# Patient Record
Sex: FEMALE | Race: WHITE | Employment: FULL TIME | URBAN - METROPOLITAN AREA
[De-identification: names, ages, dates, MRNs, and addresses within clinical notes are randomized per-mention and may not be internally consistent; named-entity substitution may affect disease eponyms.]

---

## 2017-05-23 ENCOUNTER — RESULT FOLLOW UP (OUTPATIENT)
Dept: FAMILY MEDICINE | Facility: CLINIC | Age: 32
End: 2017-05-23

## 2017-05-23 ENCOUNTER — OFFICE VISIT (OUTPATIENT)
Dept: FAMILY MEDICINE | Facility: CLINIC | Age: 32
End: 2017-05-23
Payer: COMMERCIAL

## 2017-05-23 VITALS
HEIGHT: 64 IN | DIASTOLIC BLOOD PRESSURE: 76 MMHG | HEART RATE: 70 BPM | TEMPERATURE: 98 F | OXYGEN SATURATION: 100 % | BODY MASS INDEX: 18.95 KG/M2 | SYSTOLIC BLOOD PRESSURE: 112 MMHG | WEIGHT: 111 LBS

## 2017-05-23 DIAGNOSIS — Z12.4 SCREENING FOR MALIGNANT NEOPLASM OF CERVIX: ICD-10-CM

## 2017-05-23 DIAGNOSIS — N87.1 CIN II (CERVICAL INTRAEPITHELIAL NEOPLASIA II): ICD-10-CM

## 2017-05-23 DIAGNOSIS — Z30.8 ENCOUNTER FOR OTHER CONTRACEPTIVE MANAGEMENT: ICD-10-CM

## 2017-05-23 DIAGNOSIS — Z01.419 ENCOUNTER FOR GYNECOLOGICAL EXAMINATION WITHOUT ABNORMAL FINDING: Primary | ICD-10-CM

## 2017-05-23 DIAGNOSIS — Z02.9 ADMINISTRATIVE ENCOUNTER: ICD-10-CM

## 2017-05-23 LAB
CHOLEST SERPL-MCNC: 163 MG/DL
GLUCOSE SERPL-MCNC: 99 MG/DL (ref 70–99)
HDLC SERPL-MCNC: 57 MG/DL
HGB BLD-MCNC: 13.8 G/DL (ref 11.7–15.7)
LDLC SERPL CALC-MCNC: 81 MG/DL
NONHDLC SERPL-MCNC: 106 MG/DL
TRIGL SERPL-MCNC: 123 MG/DL

## 2017-05-23 PROCEDURE — G0145 SCR C/V CYTO,THINLAYER,RESCR: HCPCS | Performed by: NURSE PRACTITIONER

## 2017-05-23 PROCEDURE — 87491 CHLMYD TRACH DNA AMP PROBE: CPT | Performed by: NURSE PRACTITIONER

## 2017-05-23 PROCEDURE — 87591 N.GONORRHOEAE DNA AMP PROB: CPT | Performed by: NURSE PRACTITIONER

## 2017-05-23 PROCEDURE — 80061 LIPID PANEL: CPT | Performed by: NURSE PRACTITIONER

## 2017-05-23 PROCEDURE — 87624 HPV HI-RISK TYP POOLED RSLT: CPT | Performed by: NURSE PRACTITIONER

## 2017-05-23 PROCEDURE — 87389 HIV-1 AG W/HIV-1&-2 AB AG IA: CPT | Performed by: NURSE PRACTITIONER

## 2017-05-23 PROCEDURE — 85018 HEMOGLOBIN: CPT | Performed by: NURSE PRACTITIONER

## 2017-05-23 PROCEDURE — 86803 HEPATITIS C AB TEST: CPT | Performed by: NURSE PRACTITIONER

## 2017-05-23 PROCEDURE — 82947 ASSAY GLUCOSE BLOOD QUANT: CPT | Performed by: NURSE PRACTITIONER

## 2017-05-23 PROCEDURE — 36415 COLL VENOUS BLD VENIPUNCTURE: CPT | Performed by: NURSE PRACTITIONER

## 2017-05-23 PROCEDURE — 99395 PREV VISIT EST AGE 18-39: CPT | Performed by: NURSE PRACTITIONER

## 2017-05-23 PROCEDURE — 86780 TREPONEMA PALLIDUM: CPT | Performed by: NURSE PRACTITIONER

## 2017-05-23 RX ORDER — ETONOGESTREL AND ETHINYL ESTRADIOL VAGINAL RING .015; .12 MG/D; MG/D
1 RING VAGINAL
Qty: 4 EACH | Refills: 4 | Status: SHIPPED | OUTPATIENT
Start: 2017-05-23 | End: 2017-08-11

## 2017-05-23 RX ORDER — CYCLOSPORINE 0.5 MG/ML
EMULSION OPHTHALMIC
COMMUNITY
Start: 2017-01-23 | End: 2018-06-11

## 2017-05-23 NOTE — PROGRESS NOTES
SUBJECTIVE:     CC: Gracie Sanches is an 32 year old woman who presents for preventive health visit.     Physical   Annual:     Getting at least 3 servings of Calcium per day::  Yes    Bi-annual eye exam::  Yes    Dental care twice a year::  NO    Sleep apnea or symptoms of sleep apnea::  Daytime drowsiness    Diet::  Regular (no restrictions)    Frequency of exercise::  2-3 days/week    Duration of exercise::  15-30 minutes    Taking medications regularly::  Yes    Medication side effects::  None    Additional concerns today::  YES    1. 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  She is requesting forms be completed.        Today's PHQ-2 Score:   PHQ-2 ( 1999 [employer]) 5/20/2017   Q1: Little interest or pleasure in doing things -   Q2: Feeling down, depressed or hopeless -   PHQ-2 Score -   Little interest or pleasure in doing things Not at all   Feeling down, depressed or hopeless Not at all   PHQ-2 Score 0       Abuse: Current or Past(Physical, Sexual or Emotional)- No  Do you feel safe in your environment - Yes    Social History   Substance Use Topics     Smoking status: Never Smoker     Smokeless tobacco: Not on file     Alcohol use Yes      Comment: very infrequent     The patient does not drink >3 drinks per day nor >7 drinks per week.    Recent Labs   Lab Test  06/10/14   1114   CHOL  151   HDL  56   LDL  72   TRIG  118   CHOLHDLRATIO  2.7       Reviewed orders with patient.  Reviewed health maintenance and updated orders accordingly - Yes    Mammo Decision Support:  Mammo discussed, not appropriate for or declined by this patient.    Pertinent mammograms are reviewed under the imaging tab.  History of abnormal Pap smear:   Last 3 Pap Results:   PAP (no units)   Date Value   06/10/2014 NIL       Reviewed and updated as needed this visit by clinical staff  Tobacco  Allergies  Meds  Med Hx  Surg Hx  Fam Hx  Soc Hx        Reviewed and updated as needed this visit  by Provider            ROS:  C: NEGATIVE for fever, chills, change in weight  I: NEGATIVE for worrisome rashes, moles or lesions  E: NEGATIVE for vision changes or irritation  ENT: NEGATIVE for ear, mouth and throat problems  R: NEGATIVE for significant cough or SOB  B: NEGATIVE for masses, tenderness or discharge  CV: NEGATIVE for chest pain, palpitations or peripheral edema  GI: NEGATIVE for nausea, abdominal pain, heartburn, or change in bowel habits  : NEGATIVE for unusual urinary or vaginal symptoms. Periods are regular/light.  M: NEGATIVE for significant arthralgias or myalgia  N: NEGATIVE for weakness, dizziness or paresthesias  E: NEGATIVE for temperature intolerance, skin/hair changes  P: NEGATIVE for changes in mood or affect    Problem list, Medication list, Allergies, and Medical/Social/Surgical histories reviewed in [facility] and updated as appropriate.  Labs reviewed in EPIC  BP Readings from Last 3 Encounters:   05/23/17 112/76   12/09/16 120/70   11/02/16 92/60    Wt Readings from Last 3 Encounters:   05/23/17 111 lb (50.3 kg)   12/09/16 108 lb (49 kg)   11/02/16 108 lb (49 kg)              Patient Active Problem List   Diagnosis     Chronic urinary urgency     Hordeolum externum, unspecified laterality     Lump of right breast 10 o'clock position     Past Surgical History:   Procedure Laterality Date     BIOPSY  2011    mole removed from foot- benign     COLONOSCOPY  2010       Social History   Substance Use Topics     Smoking status: Never Smoker     Smokeless tobacco: Not on file     Alcohol use Yes      Comment: very infrequent     Family History   Problem Relation Age of Onset     CANCER Father      ?squamous     Other - See Comments Father      diverticulitis      Hyperlipidemia Father      Other Cancer Father      Skin cancer many years ago, was removed     Skin Cancer Father      CEREBROVASCULAR DISEASE Paternal Grandfather      Hypertension Paternal Grandfather      Hypertension Paternal  "Grandmother      Asthma Sister      execma     Depression Sister      Asthma Sister      Melanoma No family hx of          Current Outpatient Prescriptions   Medication Sig Dispense Refill     etonogestrel-ethinyl estradiol (NUVARING) 0.12-0.015 MG/24HR vaginal ring Place 1 each vaginally every 28 days 4 each 4     Omega-3 Fatty Acids (OMEGA-3 FISH OIL PO)        UNABLE TO FIND MEDICATION NAME: Calcium       cetirizine (ZYRTEC) 10 MG tablet Take 10 mg by mouth daily       Cholecalciferol (VITAMIN D3 PO) Take 1,000 Units by mouth daily       RESTASIS 0.05 % ophthalmic emulsion        ketoconazole (NIZORAL) 2 % shampoo Use to cleanse body daily. (Patient not taking: Reported on 5/23/2017) 120 mL 11     [DISCONTINUED] etonogestrel-ethinyl estradiol (NUVARING) 0.12-0.015 MG/24HR vaginal ring Place 1 each vaginally every 28 days 4 each 4     Allergies   Allergen Reactions     Seasonal Allergies Fatigue, Hives, Itching and Other (See Comments)     Augmentin Rash     Recent Labs   Lab Test  06/26/15   1029  06/10/14   1114   LDL   --   72   HDL   --   56   TRIG   --   118   TSH  1.18   --       OBJECTIVE:     /76  Pulse 70  Temp 98  F (36.7  C) (Oral)  Ht 5' 3.75\" (1.619 m)  Wt 111 lb (50.3 kg)  LMP 05/14/2017  SpO2 100%  Breastfeeding? No  BMI 19.2 kg/m2  EXAM:  GENERAL: healthy, alert and no distress  EYES: Eyes grossly normal to inspection, PERRL and conjunctivae and sclerae normal  HENT: ear canals and TM's normal, nose and mouth without ulcers or lesions  NECK: no adenopathy, no asymmetry, masses, or scars and thyroid normal to palpation  RESP: lungs clear to auscultation - no rales, rhonchi or wheezes  BREAST: normal without masses, tenderness or nipple discharge and no palpable axillary masses or adenopathy  CV: regular rate and rhythm, normal S1 S2, no S3 or S4, no murmur, click or rub, no peripheral edema and peripheral pulses strong  ABDOMEN: soft, nontender, no hepatosplenomegaly, no masses and " "bowel sounds normal   (female): normal female external genitalia, normal urethral meatus, vaginal mucosa pink, moist, well rugated, and normal cervix/adnexa/uterus without masses or discharge  MS: no gross musculoskeletal defects noted, no edema  SKIN: no suspicious lesions or rashes  NEURO: Normal strength and tone, mentation intact and speech normal  PSYCH: mentation appears normal, affect normal/bright    ASSESSMENT/PLAN:     (Z01.419) Encounter for gynecological examination without abnormal finding  (primary encounter diagnosis)  Comment:   Plan: Pap imaged thin layer screen with HPV -         recommended age 30 - 65 years (select HPV order        below), HPV High Risk Types DNA Cervical, Lipid        panel reflex to direct LDL, Hemoglobin,         Glucose, Chlamydia trachomatis PCR, Neisseria         gonorrhoeae PCR, Anti Treponema, Hepatitis C         antibody, HIV Antigen Antibody Combo            (Z02.9) Administrative encounter  Comment:   Plan: forms for school were completed, signed, and sent with the patient.     (Z30.8) Encounter for other contraceptive management  Comment:   Plan: etonogestrel-ethinyl estradiol (NUVARING)         0.12-0.015 MG/24HR vaginal ring        Refills given    (Z12.4) Screening for malignant neoplasm of cervix  Comment:   Plan: done today        COUNSELING:  Reviewed preventive health counseling, as reflected in patient instructions         reports that she has never smoked. She does not have any smokeless tobacco history on file.    Estimated body mass index is 19.2 kg/(m^2) as calculated from the following:    Height as of this encounter: 5' 3.75\" (1.619 m).    Weight as of this encounter: 111 lb (50.3 kg).   Weight management plan noted, stable and monitoring    Counseling Resources:  ATP IV Guidelines  Pooled Cohorts Equation Calculator  Breast Cancer Risk Calculator  FRAX Risk Assessment  ICSI Preventive Guidelines  Dietary Guidelines for Americans, 2010  USDA's " MyPlate  ASA Prophylaxis  Lung CA Screening    DEBRA Camargo [facility]  Answers for HPI/ROS submitted by the patient on 5/20/2017   Q1: Little interest or pleasure in doing things: 0=Not at all  Q2: Feeling down, depressed or hopeless: 0=Not at all  PHQ-2 Score: 0

## 2017-05-23 NOTE — MR AVS SNAPSHOT
After Visit Summary   5/23/2017    Gracie Sanches    MRN: [id number]           Patient Information     Date Of Birth          1985        Visit Information        Provider Department      5/23/2017 11:40 AM Ava Olsen APRN [facility]        Today's Diagnoses     Encounter for gynecological examination without abnormal finding    -  1    Administrative encounter        Encounter for other contraceptive management        Screening for malignant neoplasm of cervix          Care Instructions      Preventive Health Recommendations  Female Ages 26 - 39  Yearly exam:   See your health care provider every year in order to    Review health changes.     Discuss preventive care.      Review your medicines if you your doctor has prescribed any.    Until age 30: Get a Pap test every three years (more often if you have had an abnormal result).    After age 30: Talk to your doctor about whether you should have a Pap test every 3 years or have a Pap test with HPV screening every 5 years.   You do not need a Pap test if your uterus was removed (hysterectomy) and you have not had cancer.  You should be tested each year for STDs (sexually transmitted diseases), if you're at risk.   Talk to your provider about how often to have your cholesterol checked.  If you are at risk for diabetes, you should have a diabetes test (fasting glucose).  Shots: Get a flu shot each year. Get a tetanus shot every 10 years.   Nutrition:     Eat at least 5 servings of fruits and vegetables each day.    Eat whole-grain bread, whole-wheat pasta and brown rice instead of white grains and rice.    Talk to your provider about Calcium and Vitamin D.     Lifestyle    Exercise at least 150 minutes a week (30 minutes a day, 5 days of the week). This will help you control your weight and prevent disease.    Limit alcohol to one drink per day.    No smoking.     Wear sunscreen to prevent skin  "cancer.    See your dentist every six months for an exam and cleaning.          Follow-ups after your visit        Your next 10 appointments already scheduled     Jun 01, 2017  1:40 PM CDT   (Arrive by 1:25 PM)   [facility] with Yuliya Holland OD   [facility] ([facility] [place])    [address]  [address] [facility] [place] [address] [phone]              Who to contact     If you have questions or need follow up information about today's clinic visit or your schedule please contact [facility] directly at [phone].  Normal or non-critical lab and imaging results will be communicated to you by SpokenLayerhart, letter or phone within 4 business days after the clinic has received the results. If you do not hear from us within 7 days, please contact the clinic through [employer] or phone. If you have a critical or abnormal lab result, we will notify you by phone as soon as possible.  Submit refill requests through [employer] or call your pharmacy and they will forward the refill request to us. Please allow 3 business days for your refill to be completed.          Additional Information About Your Visit        [facility] Information     [employer] gives you secure access to your electronic health record. If you see a primary care provider, you can also send messages to your care team and make appointments. If you have questions, please call your primary care clinic.  If you do not have a primary care provider, please call [phone] and they will assist you.        Care EveryWhere ID     This is your Care EveryWhere ID. This could be used by other organizations to access your [facility] medical records  [id number]        Your Vitals Were     Pulse Temperature Height Last Period Pulse Oximetry Breastfeeding?    70 98  F (36.7  C) (Oral) 5' 3.75\" (1.619 m) 05/14/2017 100% No    BMI (Body Mass Index)                   19.2 kg/m2            Blood Pressure " from Last 3 Encounters:   05/23/17 112/76   12/09/16 120/70   11/02/16 92/60    Weight from Last 3 Encounters:   05/23/17 111 lb (50.3 kg)   12/09/16 108 lb (49 kg)   11/02/16 108 lb (49 kg)              We Performed the Following     Anti Treponema     Chlamydia trachomatis PCR     Glucose     Hemoglobin     Hepatitis C antibody     HIV Antigen Antibody Combo     HPV High Risk Types DNA Cervical     Lipid panel reflex to direct LDL     Neisseria gonorrhoeae PCR     Pap imaged thin layer screen with HPV - recommended age 30 - 65 years (select HPV order below)          Today's Medication Changes          These changes are accurate as of: 5/23/17  1:12 PM.  If you have any questions, ask your nurse or doctor.               Stop taking these medicines if you haven't already. Please contact your care team if you have questions.     ALLEGRA-D 24 HOUR PO   Stopped by:  Ava Olsen APRN CNP           FLAX SEED OIL PO   Stopped by:  Ava Olsen APRN CNP           UNABLE TO FIND   Stopped by:  Ava Olsen APRN CNP                Where to get your medicines      Some of these will need a paper prescription and others can be bought over the counter.  Ask your nurse if you have questions.     Bring a paper prescription for each of these medications     etonogestrel-ethinyl estradiol 0.12-0.015 MG/24HR vaginal ring                Primary Care Provider Office Phone # Fax #    DEBRA Field [phone]       [facility] [address] [place] [address]        Thank you!     Thank you for choosing [facility]  for your care. Our goal is always to provide you with excellent care. Hearing back from our patients is one way we can continue to improve our services. Please take a few minutes to complete the written survey that you may receive in the mail after your visit with us. Thank you!             Your Updated Medication  List - Protect others around you: Learn how to safely use, store and throw away your medicines at www.disposemymeds.org.          This list is accurate as of: 5/23/17  1:12 PM.  Always use your most recent med list.                   Brand Name Dispense Instructions for use    cetirizine 10 MG tablet    zyrTEC     Take 10 mg by mouth daily       etonogestrel-ethinyl estradiol 0.12-0.015 MG/24HR vaginal ring    NUVARING    4 each    Place 1 each vaginally every 28 days       ketoconazole 2 % shampoo    NIZORAL    120 mL    Use to cleanse body daily.       OMEGA-3 FISH OIL PO          RESTASIS 0.05 % ophthalmic emulsion   Generic drug:  cycloSPORINE          UNABLE TO FIND      MEDICATION NAME: Calcium       VITAMIN D3 PO      Take 1,000 Units by mouth daily

## 2017-05-23 NOTE — NURSING NOTE
"Chief Complaint   Patient presents with     Physical     Forms       Initial /76  Pulse 70  Temp 98  F (36.7  C) (Oral)  Ht 5' 3.75\" (1.619 m)  Wt 111 lb (50.3 kg)  LMP 05/14/2017  SpO2 100%  Breastfeeding? No  BMI 19.2 kg/m2 Estimated body mass index is 19.2 kg/(m^2) as calculated from the following:    Height as of this encounter: 5' 3.75\" (1.619 m).    Weight as of this encounter: 111 lb (50.3 kg).  Medication Reconciliation: complete       Al Corona MA       "

## 2017-05-24 LAB
C TRACH DNA SPEC QL NAA+PROBE: NORMAL
HCV AB SERPL QL IA: NORMAL
HIV 1+2 AB+HIV1 P24 AG SERPL QL IA: NORMAL
N GONORRHOEA DNA SPEC QL NAA+PROBE: NORMAL
SPECIMEN SOURCE: NORMAL
SPECIMEN SOURCE: NORMAL
T PALLIDUM IGG+IGM SER QL: NEGATIVE

## 2017-05-25 LAB
COPATH REPORT: NORMAL
PAP: NORMAL

## 2017-05-25 NOTE — PROGRESS NOTES
Aleksander Bowman,    This note is to let you know that your labs and your STD panel all came back normal. The pap smear results will be sent to you separately.     Let me know if you have any questions.    Ava

## 2017-05-26 LAB
FINAL DIAGNOSIS: ABNORMAL
HPV HR 12 DNA CVX QL NAA+PROBE: NEGATIVE
HPV16 DNA SPEC QL NAA+PROBE: POSITIVE
HPV18 DNA SPEC QL NAA+PROBE: NEGATIVE
SPECIMEN DESCRIPTION: ABNORMAL

## 2017-05-30 PROBLEM — R87.810 CERVICAL HIGH RISK HPV (HUMAN PAPILLOMAVIRUS) TEST POSITIVE: Status: ACTIVE | Noted: 2017-05-23

## 2017-05-30 NOTE — PROGRESS NOTES
05/23/17: NIL pap, + HR HPV type 16 result. Plan [place] due by 08/23/17.  05/30/17: I called the pt and left a message for the pt to call me back. The pt returned the call and was advised of the results and recommendations.  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 Plan cotest in 1 year. Pt was advised.  08/01/18:  NIL pap, + HR HPV type 16 result. Plan [place] due bef 11/01/18.  08/08/18: Msg left to call back. Pt left a voice mail requesting that I leave her a detailed voice mail message with the results and recommendations.   08/09/18: Detailed voice mail left with the results and recommendations on the pt's cell phone number.  10/16/18: [place] Bx and ECC Neg for dysplasia. Plan cotest in 1 year. Provider sent a [employer] message to the pt with the [place] results and recommendations. Pt viewed this Insync Systemshart message.   9/10/19 NIL pap, + HR HPV 16. Plan [facility]p. ([facility])  09/17/19:Msg left to call back. Pt called and left a voice mail message requesting that I leave her a detailed voice mail message with her results and recommendations. I did do this per her request. I also sent her [employer] message with this information. Pt viewed mychart message.  11/19/19 [place]. BX normal. Plan: cotest 1 year. Viewed in my chart. ([facility])

## 2017-06-01 ENCOUNTER — OFFICE VISIT (OUTPATIENT)
Dept: OPHTHALMOLOGY | Facility: CLINIC | Age: 32
End: 2017-06-01

## 2017-06-01 DIAGNOSIS — H04.123 DRY EYES, BILATERAL: Primary | ICD-10-CM

## 2017-06-01 DIAGNOSIS — H01.00B BLEPHARITIS OF UPPER AND LOWER EYELIDS OF BOTH EYES, UNSPECIFIED TYPE: ICD-10-CM

## 2017-06-01 DIAGNOSIS — H01.00A BLEPHARITIS OF UPPER AND LOWER EYELIDS OF BOTH EYES, UNSPECIFIED TYPE: ICD-10-CM

## 2017-06-01 DIAGNOSIS — H16.223 KERATITIS SICCA, BILATERAL: ICD-10-CM

## 2017-06-01 RX ORDER — CYCLOSPORINE 0.5 MG/ML
1 EMULSION OPHTHALMIC 2 TIMES DAILY
Qty: 180 EACH | Refills: 3 | Status: SHIPPED | OUTPATIENT
Start: 2017-06-01 | End: 2018-06-11

## 2017-06-01 ASSESSMENT — REFRACTION_MANIFEST
OS_SPHERE: PLANO
OD_SPHERE: PLANO
OS_CYLINDER: SPHERE
OD_CYLINDER: SPHERE

## 2017-06-01 ASSESSMENT — CONF VISUAL FIELD
METHOD: COUNTING FINGERS
OS_NORMAL: 1
OD_NORMAL: 1

## 2017-06-01 ASSESSMENT — TONOMETRY
OS_IOP_MMHG: 25
IOP_METHOD: ICARE
OD_IOP_MMHG: 22

## 2017-06-01 ASSESSMENT — VISUAL ACUITY
METHOD: SNELLEN - LINEAR
OS_SC: 20/20
OD_SC: J1+
OD_SC: 20/20
OS_SC: J1+

## 2017-06-01 ASSESSMENT — EXTERNAL EXAM - LEFT EYE: OS_EXAM: NORMAL

## 2017-06-01 ASSESSMENT — EXTERNAL EXAM - RIGHT EYE: OD_EXAM: NORMAL

## 2017-06-01 ASSESSMENT — CUP TO DISC RATIO
OS_RATIO: 0.20
OD_RATIO: 0.20

## 2017-06-01 NOTE — NURSING NOTE
Chief Complaints and History of Present Illnesses   Patient presents with     Annual Eye Exam     Vision is good without glasses, she is on Restasis     HPI    Affected eye(s):  Both   Symptoms:     No blurred vision   No decreased vision      Frequency:  Constant       Do you have eye pain now?:  No      Comments:  Patient states that vision is stable since her last eye exam. She sees well without glasses. She is on Restasis this year BID OU. The Restasis will  in July and may need it renewed. She was told she was not blinking completely and had some dryness. Denies eye pain. She had some blepharitis in the fall. She had a few styes that did not resolve quickly as normal. But has had none since then. She is doing warm compresses a few times a week.    Kenia Jeong COT 1:41 PM 2017

## 2017-06-01 NOTE — MR AVS SNAPSHOT
After Visit Summary   6/1/2017    Gracie Sanches    MRN: [id number]           Patient Information     Date Of Birth          1985        Visit Information        Provider Department      6/1/2017 1:40 PM Yuliya Holland OD M [facility]        Today's Diagnoses     Dry eyes, bilateral    -  1    Keratitis sicca, bilateral        Blepharitis of upper and lower eyelids of both eyes, unspecified type           Follow-ups after your visit        Your next 10 appointments already scheduled     Jul 26, 2017  3:00 PM CDT   Colposcopy with Belkys Quinones MD   [facility]    [address] [address] [phone]              Who to contact     Please call your clinic at [phone] to:    Ask questions about your health    Make or cancel appointments    Discuss your medicines    Learn about your test results    Speak to your doctor   If you have compliments or concerns about an experience at your clinic, or if you wish to file a complaint, please contact [facility] Patient Relations at [phone] or email us at [email or web address]         Additional Information About Your Visit        MyChart Information     "[employer] gives you secure access to your electronic health record. If you see a primary care provider, you can also send messages to your care team and make appointments. If you have questions, please call your primary care clinic.  If you do not have a primary care provider, please call [phone] and they will assist you.      "[employer]" is an electronic gateway that provides easy, online access to your medical records. With "[employer]", you can request a clinic appointment, read your test results, renew a prescription or communicate with your care team.     To access your existing account, please contact your [facility] Clinic or call  [phone] for assistance.        Care EveryWhere ID     This is your Care EveryWhere ID. This could be used by other organizations to access your [facility] medical records  [id number]        Your Vitals Were     Last Period                   05/14/2017            Blood Pressure from Last 3 Encounters:   05/23/17 112/76   12/09/16 120/70   11/02/16 92/60    Weight from Last 3 Encounters:   05/23/17 50.3 kg (111 lb)   12/09/16 49 kg (108 lb)   11/02/16 49 kg (108 lb)              Today, you had the following     No orders found for display         Today's Medication Changes          These changes are accurate as of: 6/1/17  3:18 PM.  If you have any questions, ask your nurse or doctor.               These medicines have changed or have updated prescriptions.        Dose/Directions    * RESTASIS 0.05 % ophthalmic emulsion   This may have changed:  Another medication with the same name was added. Make sure you understand how and when to take each.   Generic drug:  cycloSPORINE   Changed by:  Ava Olsen APRN CNP        Refills:  0       * cycloSPORINE 0.05 % ophthalmic emulsion   Commonly known as:  RESTASIS   This may have changed:  You were already taking a medication with the same name, and this prescription was added. Make sure you understand how and when to take each.   Used for:  Keratitis sicca, bilateral, Dry eyes, bilateral, Blepharitis of upper and lower eyelids of both eyes, unspecified type   Changed by:  Yuliya Holland OD        Dose:  1 drop   Place 1 drop into both eyes 2 times daily   Quantity:  180 each   Refills:  3       * Notice:  This list has 2 medication(s) that are the same as other medications prescribed for you. Read the directions carefully, and ask your doctor or other care provider to review them with you.         Where to get your medicines      These medications were sent to [facility] - [place], [place] - [address]  [address], [place] [address]     Phone:  [phone]     cycloSPORINE 0.05 % ophthalmic emulsion                Primary Care Provider Office Phone # Fax #    DEBRA Field APOLONIA [phone]       [place] [address] [place] [address]        Thank you!     Thank you for choosing [facility]  for your care. Our goal is always to provide you with excellent care. Hearing back from our patients is one way we can continue to improve our services. Please take a few minutes to complete the written survey that you may receive in the mail after your visit with us. Thank you!             Your Updated Medication List - Protect others around you: Learn how to safely use, store and throw away your medicines at www.disposemymeds.org.          This list is accurate as of: 6/1/17  3:18 PM.  Always use your most recent med list.                   Brand Name Dispense Instructions for use    cetirizine 10 MG tablet    zyrTEC     Take 10 mg by mouth daily       etonogestrel-ethinyl estradiol 0.12-0.015 MG/24HR vaginal ring    NUVARING    4 each    Place 1 each vaginally every 28 days       ketoconazole 2 % shampoo    NIZORAL    120 mL    Use to cleanse body daily.       OMEGA-3 FISH OIL PO          * RESTASIS 0.05 % ophthalmic emulsion   Generic drug:  cycloSPORINE          * cycloSPORINE 0.05 % ophthalmic emulsion    RESTASIS    180 each    Place 1 drop into both eyes 2 times daily       UNABLE TO FIND      MEDICATION NAME: Calcium       VITAMIN D3 PO      Take 1,000 Units by mouth daily       * Notice:  This list has 2 medication(s) that are the same as other medications prescribed for you. Read the directions carefully, and ask your doctor or other care provider to review them with you.

## 2017-06-01 NOTE — PROGRESS NOTES
A/P  1.) Dry Eye with MGD OU  -H/o styes/lid issues  -Symptoms have moderately improved since starting Restasis  -Still with some gland atrophy and corneal stain  -Continue warm compresses and fish oil, add tea tree oil lid cleanser  -Continue Restasis    RTC 5-6 months for dry eye recheck    I have confirmed the patient's CC, HPI and reviewed Past Medical History, Past Surgical History, Social History, Family History, Problem List, Medication List and agree with Tech note.     Yuliya Holland, OD [facility]

## 2017-08-11 ENCOUNTER — OFFICE VISIT (OUTPATIENT)
Dept: OBGYN | Facility: CLINIC | Age: 32
End: 2017-08-11
Payer: COMMERCIAL

## 2017-08-11 VITALS
SYSTOLIC BLOOD PRESSURE: 108 MMHG | DIASTOLIC BLOOD PRESSURE: 62 MMHG | WEIGHT: 114.5 LBS | HEIGHT: 64 IN | TEMPERATURE: 98 F | HEART RATE: 92 BPM | BODY MASS INDEX: 19.55 KG/M2

## 2017-08-11 DIAGNOSIS — R87.810 CERVICAL HIGH RISK HUMAN PAPILLOMAVIRUS (HPV) DNA TEST POSITIVE: Primary | ICD-10-CM

## 2017-08-11 LAB — BETA HCG QUAL IFA URINE: NEGATIVE

## 2017-08-11 PROCEDURE — 84703 CHORIONIC GONADOTROPIN ASSAY: CPT | Performed by: OBSTETRICS & GYNECOLOGY

## 2017-08-11 PROCEDURE — 88305 TISSUE EXAM BY PATHOLOGIST: CPT | Performed by: OBSTETRICS & GYNECOLOGY

## 2017-08-11 PROCEDURE — 57455 BIOPSY OF CERVIX W/SCOPE: CPT | Performed by: OBSTETRICS & GYNECOLOGY

## 2017-08-11 NOTE — PROGRESS NOTES
Gracie Sanches is a 32 year old female  who presents for a repeat colposcopy, referred by Dr. Olsen.  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  Pap smear 3 months ago showed: normal and with high risk HPV present: 16. The prior pap showed normal.     No LMP recorded.  UPT today is negative  Patient does not smoke  Type of contraception: condoms  Age at first sexual intercourse: 18  Number of sexual partners (lifetime): <6  Past GYN history: HPV  Prior cervical/vaginal disease: Normal exam without visible pathology.  Prior cervical treatment: no treatment.    PROCEDURE:  Before the procedure, it was ensured that the patient was educated regarding the nature of her findings to date, the implications, and what was to be done. She has been made aware of the role of HPV, the natural history of infection, ways to minimize her future risk, the effect of HPV on the cervix, and treatment options available should they be indicated. The details of the   colposcopic procedure were reviewed. All questions were answered before proceeding, and informed consent was therefore obtained.  Speculum placed in vagina and excellent visualization of cervix   acheived, cervix swabbed x 3 with acetic acid solution.    FINDINGS:  Cervix:  Sharply demarcated acetowhitening noted at 12 o'clock   Please refer to images section for details.  Pap repeated?: No  SCJ seen?: yes   ECC done?: No  Lugol's solution used?: No  Satisfactory examination?: yes  ASSESSMENT: HPV related changes.  PLAN:  Cervical biopsy taken at 12 o'clock, at the SCJ which was at external os of cervix.  If benign or less than HIRA II recommend f/u co-testing in 12 months.     Carmen Lofton MD

## 2017-08-11 NOTE — MR AVS SNAPSHOT
After Visit Summary   8/11/2017    Gracie Sanches    MRN: [id number]           Patient Information     Date Of Birth          1985        Visit Information        Provider Department      8/11/2017 1:30 PM Carmen Lofton MD; [facility]        Today's Diagnoses     HIRA II (cervical intraepithelial neoplasia II)    -  1      Care Instructions    Colposcopy Post-Procedure Patient Instructions    You may experience any of the following for a couple of days following colposcopy:    Mild cramping    Vaginal bleeding     Vaginal discharge that looks black and clumpy    Please call your healthcare provider if you have any of the following symptoms:    Fever--Temperature greater than 100 degrees    Cramping after 48 hours    Bleeding heavier than a normal period in the first 24-48 hours    If you are bleeding and soaking more than one pad an hour    Or any other worrisome problems.    We recommend that:    You use pads, not tampons for the bleeding.    You may resume sexual activity in 2-3 days, or after bleeding stops.    You may use Tylenol or ibuprofen (Motrin or Advil) for any discomfort.      [facility] - OB/GYN : [phone]              Follow-ups after your visit        Who to contact     If you have questions or need follow up information about today's clinic visit or your schedule please contact [facility] [place] [place] directly at [phone].  Normal or non-critical lab and imaging results will be communicated to you by MyChart, letter or phone within 4 business days after the clinic has received the results. If you do not hear from us within 7 days, please contact the clinic through MyChart or phone. If you have a critical or abnormal lab result, we will notify you by phone as soon as possible.  Submit refill requests through TravelCLICK or call your pharmacy and they will forward the refill request to us. Please allow 3 business days for your  "refill to be completed.          Additional Information About Your Visit        MyChart Information     Known gives you secure access to your electronic health record. If you see a primary care provider, you can also send messages to your care team and make appointments. If you have questions, please call your primary care clinic.  If you do not have a primary care provider, please call [phone] and they will assist you.        Care EveryWhere ID     This is your Care EveryWhere ID. This could be used by other organizations to access your [facility] medical records  [id number]        Your Vitals Were     Pulse Temperature Height Last Period BMI (Body Mass Index)       92 98  F (36.7  C) (Oral) 5' 3.75\" (1.619 m) 07/24/2017 19.81 kg/m2        Blood Pressure from Last 3 Encounters:   08/11/17 108/62   05/23/17 112/76   12/09/16 120/70    Weight from Last 3 Encounters:   08/11/17 114 lb 8 oz (51.9 kg)   05/23/17 111 lb (50.3 kg)   12/09/16 108 lb (49 kg)              We Performed the Following     Beta HCG qual IFA urine        Primary Care Provider Office Phone # Fax #    DEB[facility] [phone] [address] [place] [address]        Equal Access to Services     RAMONA SEGOVIA : Hadii aad ku hadasho Soomaali, waaxda luqadaha, qaybta kaalmada adeegyada, waxay lashaunin hayalton shin . So [facility] [phone].    ATENCIÓN: Si habla español, tiene a medrano disposición servicios gratuitos de asistencia lingüística. Llame al [phone].    We comply with applicable federal civil rights laws and [place] laws. We do not discriminate on the basis of race, color, national origin, age, disability sex, sexual orientation or gender identity.            Thank you!     Thank you for [place] [facility]  [place]. Our goal is always to provide you with excellent care. Hearing back from our patients is one way we can continue to improve our " services. Please take a few minutes to complete the written survey that you may receive in the mail after your visit with us. Thank you!             Your Updated Medication List - Protect others around you: Learn how to safely use, store and throw away your medicines at www.disposemymeds.org.          This list is accurate as of: 8/11/17  1:38 PM.  Always use your most recent med list.                   Brand Name Dispense Instructions for use Diagnosis    cetirizine 10 MG tablet    zyrTEC     Take 10 mg by mouth daily        OMEGA-3 FISH OIL PO           PRENATAL VITAMINS PO           * RESTASIS 0.05 % ophthalmic emulsion   Generic drug:  cycloSPORINE           * cycloSPORINE 0.05 % ophthalmic emulsion    RESTASIS    180 each    Place 1 drop into both eyes 2 times daily    Keratitis sicca, bilateral, Dry eyes, bilateral, Blepharitis of upper and lower eyelids of both eyes, unspecified type       UNABLE TO FIND      MEDICATION NAME: Calcium        VITAMIN D3 PO      Take 1,000 Units by mouth daily        * Notice:  This list has 2 medication(s) that are the same as other medications prescribed for you. Read the directions carefully, and ask your doctor or other care provider to review them with you.

## 2017-08-11 NOTE — PATIENT INSTRUCTIONS

## 2017-08-15 LAB — COPATH REPORT: NORMAL

## 2018-01-02 DIAGNOSIS — Z30.8 ENCOUNTER FOR OTHER CONTRACEPTIVE MANAGEMENT: ICD-10-CM

## 2018-01-02 RX ORDER — ETONOGESTREL AND ETHINYL ESTRADIOL VAGINAL RING .015; .12 MG/D; MG/D
1 RING VAGINAL
Qty: 4 EACH | Refills: 1 | Status: SHIPPED | OUTPATIENT
Start: 2018-01-02 | End: 2019-05-31

## 2018-01-02 NOTE — TELEPHONE ENCOUNTER
Patient does want refill of Nuvaring - stated that she did discuss this with Dr. Lofton at last visit. Rx was sent.  Milena Borajs

## 2018-01-02 NOTE — TELEPHONE ENCOUNTER
Pending Prescriptions:                       Disp   Refills    etonogestrel-ethinyl estradiol (NUVARING)*4 each 4            Sig: Place 1 each vaginally every 28 days    Was discontinued in patient chart. Call to patient to see if she truly wanted this medication refilled. Left message to call clinic.  Milena Borjas

## 2018-06-11 DIAGNOSIS — H04.123 DRY EYES, BILATERAL: ICD-10-CM

## 2018-06-11 DIAGNOSIS — H01.00B BLEPHARITIS OF UPPER AND LOWER EYELIDS OF BOTH EYES, UNSPECIFIED TYPE: ICD-10-CM

## 2018-06-11 DIAGNOSIS — H01.00A BLEPHARITIS OF UPPER AND LOWER EYELIDS OF BOTH EYES, UNSPECIFIED TYPE: ICD-10-CM

## 2018-06-11 DIAGNOSIS — H16.223 KERATITIS SICCA, BILATERAL: ICD-10-CM

## 2018-06-12 RX ORDER — CYCLOSPORINE 0.5 MG/ML
1 EMULSION OPHTHALMIC 2 TIMES DAILY
Qty: 180 EACH | Refills: 1 | Status: SHIPPED | OUTPATIENT
Start: 2018-06-12

## 2018-06-12 NOTE — TELEPHONE ENCOUNTER
"Medication:  cycloSPORINE (RESTASIS) 0.05 % ophthalmic emulsion    Requested directions   Place 1 drop into both eyes 2 times daily    Current directions on the medication list   Place 1 drop into both eyes 2 times daily - Both Eyes    Last Written Prescription Date:  6/1/17  Last Fill Quantity: 180,   # refills: 3  Last Office Visit:6/1/17  Future Office visit: none    Attending Provider: SEKOU Holland  Last Clinic Note:  \" RTC 5-6 months for dry eye recheck\"     "

## 2018-08-01 ENCOUNTER — OFFICE VISIT (OUTPATIENT)
Dept: OBGYN | Facility: CLINIC | Age: 33
End: 2018-08-01
Payer: COMMERCIAL

## 2018-08-01 VITALS
BODY MASS INDEX: 19.19 KG/M2 | SYSTOLIC BLOOD PRESSURE: 122 MMHG | WEIGHT: 112.4 LBS | HEIGHT: 64 IN | DIASTOLIC BLOOD PRESSURE: 70 MMHG | HEART RATE: 79 BPM

## 2018-08-01 DIAGNOSIS — R87.810 CERVICAL HIGH RISK HUMAN PAPILLOMAVIRUS (HPV) DNA TEST POSITIVE: ICD-10-CM

## 2018-08-01 DIAGNOSIS — Z01.419 ENCOUNTER FOR GYNECOLOGICAL EXAMINATION WITHOUT ABNORMAL FINDING: Primary | ICD-10-CM

## 2018-08-01 PROCEDURE — 87624 HPV HI-RISK TYP POOLED RSLT: CPT | Performed by: OBSTETRICS & GYNECOLOGY

## 2018-08-01 PROCEDURE — 88175 CYTOPATH C/V AUTO FLUID REDO: CPT | Performed by: OBSTETRICS & GYNECOLOGY

## 2018-08-01 PROCEDURE — 99395 PREV VISIT EST AGE 18-39: CPT | Performed by: OBSTETRICS & GYNECOLOGY

## 2018-08-01 NOTE — PATIENT INSTRUCTIONS
Lifestyle recommendations when attempting pregnancy    Limit caffeine less than 300 mg/day  Alcohol less than 2 drinks per day  Exercise is fine, regular and moderate  Take multivitamin or prenatal vitamin daily  (Folic Acid 400 mcg per day)      Cycles should be 25-35 days     If more than 6 months of really trying, let me know and would do sperm test

## 2018-08-01 NOTE — PROGRESS NOTES
Gracie is a 33 year old  female who presents for annual exam.     Menses are regular q 28-30 days and normal lasting 3-4 days.  Menses flow: normal.  No LMP recorded.. Using none for contraception.  She is currently considering pregnancy.  Besides routine health maintenance,  she would like to discuss trying to get pregnant. 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  Just stopped nuvaring. Not sure when is best to try and have a baby.   Nervous about HPV type 16 and has some questions about that and transmission.  GYNECOLOGIC HISTORY:  Menarche:   Gracie is sexually active with 1male partner(s) and is currently in monogamous relationship.    History sexually transmitted infections:HPV  STI testing offered?  no  ROSIO exposure: Unknown  History of abnormal Pap smear: NO - age 30- 65 PAP every 3 years recommended  Family history of breast CA: No  Family history of uterine/ovarian CA: No    Family history of colon CA: No    HEALTH MAINTENANCE:  Cholesterol: (  Cholesterol   Date Value Ref Range Status   2017 163 <200 mg/dL Final   06/10/2014 151 <200 mg/dL Final     Comment:     LDL Cholesterol is the primary guide to therapy.   The NCEP recommends further evaluation of: patients with cholesterol greater   than 200 mg/dL if additional risk factors are present, cholesterol greater   than   240 mg/dL, triglycerides greater than 150 mg/dL, or HDL less than 40 mg/dL.      History of abnormal lipids: No  Mammo: 19 . History of abnormal Mammo: No.  Regular Self Breast Exams: Yes  Calcium/Vitamin D intake: source:  dairy Adequate? Yes  TSH: (  TSH   Date Value Ref Range Status   2015 1.18 0.40 - 4.00 mU/L Final    )  Pap; (  Lab Results   Component Value Date    PAP NIL 2017    PAP NIL 06/10/2014    )    HISTORY:  Obstetric History       T0      L0     SAB0   TAB0   Ectopic0   Multiple0   Live Births0         Past Medical History:   Diagnosis Date      Cervical high risk HPV (human papillomavirus) test positive 05/23/2017 05/23/17: NIL pap, + HR HPV type 16 result.      HSIL (high grade squamous intraepithelial lesion) on Pap smear of cervix 02/25/2010    02/25/10: HSIL pap (abstracted records)     Lump of right breast 10 o'clock position 12/9/2016     Lump of right breast 10 o'clock position      Past Surgical History:   Procedure Laterality Date     BIOPSY  2011    mole removed from foot- benign     COLONOSCOPY  2010     COLPOSCOPY CERVIX, BIOPSY CERVIX, ENDOCERVICAL CURETTAGE, COMBINED  03/11/2010    03/11/10: [facility] Bx HIRA 2 (abstracted records)     Family History   Problem Relation Age of Onset     Cancer Father      ?squamous     Other - See Comments Father      diverticulitis      Hyperlipidemia Father      Other Cancer Father      Skin cancer many years ago, was removed     Skin Cancer Father      Cerebrovascular Disease Paternal Grandfather      Hypertension Paternal Grandfather      Hypertension Paternal Grandmother      Glaucoma Paternal Grandmother      Asthma Sister      execma     Depression Sister      Asthma Sister      Melanoma No family hx of      Macular Degeneration No family hx of      Retinal detachment No family hx of      Social History     Social History     Marital status:      Spouse name: N/A     Number of children: 0     Years of education: N/A     Occupational History      Student     Social History Main Topics     Smoking status: Never Smoker     Smokeless tobacco: Not on file     Alcohol use Yes      Comment: very infrequent     Drug use: No     Sexual activity: Yes     Partners: Male     Birth control/ protection: Condom     Other Topics Concern     Parent/Sibling W/ Cabg, Mi Or Angioplasty Before 65f 55m? No     Social History Narrative       Current Outpatient Prescriptions:      cetirizine (ZYRTEC) 10 MG tablet, Take 10 mg by mouth daily, Disp: , Rfl:      Cholecalciferol (VITAMIN D3 PO), Take 1,000 Units by mouth daily,  "Disp: , Rfl:      cycloSPORINE (RESTASIS) 0.05 % ophthalmic emulsion, Place 1 drop into both eyes 2 times daily, Disp: 180 each, Rfl: 1     etonogestrel-ethinyl estradiol (NUVARING) 0.12-0.015 MG/24HR vaginal ring, Place 1 each vaginally every 28 days, Disp: 4 each, Rfl: 1     Omega-3 Fatty Acids (OMEGA-3 FISH OIL PO), , Disp: , Rfl:      Prenatal Multivit-Min-Fe-FA (PRENATAL VITAMINS PO), , Disp: , Rfl:      UNABLE TO FIND, MEDICATION NAME: Calcium, Disp: , Rfl:      Allergies   Allergen Reactions     Seasonal Allergies Fatigue, Hives, Itching and Other (See Comments)     Augmentin Rash       Past medical, surgical, social and family history were reviewed and updated in EPIC.      EXAM:  Ht 5' 3.75\" (1.619 m)  Wt 112 lb 6.4 oz (51 kg)  BMI 19.45 kg/m2   BMI: Body mass index is 19.45 kg/(m^2).  Constitutional: healthy, alert and no distress  Head: Normocephalic. No masses, lesions, tenderness or abnormalities  Neck: Neck supple. Trachea midline. No adenopathy. Thyroid symmetric, normal size.   Cardiovascular: RRR.   Respiratory: Negative.   Breast: Breasts reveal mild symmetric fibrocystic densities, but there are no dominant, discrete, fixed or suspicious masses found.  Gastrointestinal: Abdomen soft, non-tender, non-distended. No masses, organomegaly.  :  Vulva:  No external lesions, normal female hair distribution, no inguinal adenopathy.    Urethra:  Midline, non-tender, well supported, no discharge  Vagina:  Moist, pink, no abnormal discharge, no lesions  Uterus:  Normal size , non-tender, freely mobile  Ovaries:  No masses appreciated, non-tender, mobile  Rectal Exam: deferred  Musculoskeletal: extremities normal  Skin: no suspicious lesions or rashes  Psychiatric: Affect appropriate, cooperative,mentation appears normal.     COUNSELING:   Reviewed preventive health counseling, as reflected in patient instructions       Family planning       Folic Acid Counseling   reports that she has never smoked. She " does not have any smokeless tobacco history on file.    Body mass index is 19.45 kg/(m^2).    FRAX Risk Assessment    ASSESSMENT:  33 year old female with satisfactory annual exam  (Z01.419) Encounter for gynecological examination without abnormal finding  (primary encounter diagnosis)  Comment: no birthcontrol  Plan: Preconceptual counseling done today, recc start multivitamin or prenatal vitamin daily. Will wait one menses after coming off OCP's  prior to concieving. Will call or come in if cycles not regular or no cycles after 2-3 months off OCP. Ovulation questions answered.     (R86.410) Cervical high risk human papillomavirus (HPV) DNA test positive  Comment: last pap NIL, type 16  Plan: HPV High Risk Types DNA Cervical, Pap imaged         thin layer diagnostic with HPV (select HPV         order below)        Discussed if still has HPV type 16 then would need another colpo. Discussed HPV clearance and questions answered about partner transmission.    Belkys Quinones MD

## 2018-08-01 NOTE — MR AVS SNAPSHOT
After Visit Summary   8/1/2018    Gracie Sanches    MRN: [id number]           Patient Information     Date Of Birth          1985        Visit Information        Provider Department      8/1/2018 10:15 AM Belkys Quinones MD [facility]        Today's Diagnoses     Encounter for gynecological examination without abnormal finding    -  1    Cervical high risk human papillomavirus (HPV) DNA test positive          Care Instructions    Lifestyle recommendations when attempting pregnancy    Limit caffeine less than 300 mg/day  Alcohol less than 2 drinks per day  Exercise is fine, regular and moderate  Take multivitamin or prenatal vitamin daily  (Folic Acid 400 mcg per day)      Cycles should be 25-35 days     If more than 6 months of really trying, let me know and would do sperm test          Follow-ups after your visit        Who to contact     If you have questions or need follow up information about today's clinic visit or your schedule please contact [facility] directly at [phone].  Normal or non-critical lab and imaging results will be communicated to you by MyChart, letter or phone within 4 business days after the clinic has received the results. If you do not hear from us within 7 days, please contact the clinic through Sunnylofthart or phone. If you have a critical or abnormal lab result, we will notify you by phone as soon as possible.  Submit refill requests through Nutrinia or call your pharmacy and they will forward the refill request to us. Please allow 3 business days for your refill to be completed.          Additional Information About Your Visit        Sunnylofthart Information     Nutrinia gives you secure access to your electronic health record. If you see a primary care provider, you can also send messages to your care team and make appointments. If you have questions, please call your primary care clinic.  If you do not have a primary care  "provider, please call [phone] and they will assist you.        Care EveryWhere ID     This is your Care EveryWhere ID. This could be used by other organizations to access your [facility] medical records  [id number]        Your Vitals Were     Pulse Height BMI (Body Mass Index)             79 5' 3.75\" (1.619 m) 19.45 kg/m2          Blood Pressure from Last 3 Encounters:   08/01/18 122/70   08/11/17 108/62   05/23/17 112/76    Weight from Last 3 Encounters:   08/01/18 112 lb 6.4 oz (51 kg)   08/11/17 114 lb 8 oz (51.9 kg)   05/23/17 111 lb (50.3 kg)              We Performed the Following     HPV High Risk Types DNA Cervical     Pap imaged thin layer diagnostic with HPV (select HPV order below)        Primary Care Provider Office Phone # Fax #    Ava Olsen, APRN [facility] [phone] [address] [place] [address]        Equal Access to Services     RAMONA SEGOVIA : Hadii aad ku hadasho Soomaali, waaxda luqadaha, qaybta kaalmada adeegyada, waxay slime hayalton shin . So [facility] [phone].    ATENCIÓN: Si habla español, tiene a medrano disposición servicios gratuitos de asistencia lingüística. Llame al [phone].    We comply with applicable federal civil rights laws and [place] laws. We do not discriminate on the basis of race, color, national origin, age, disability, sex, sexual orientation, or gender identity.            Thank you!     Thank you for choosing [facility]  for your care. Our goal is always to provide you with excellent care. Hearing back from our patients is one way we can continue to improve our services. Please take a few minutes to complete the written survey that you may receive in the mail after your visit with us. Thank you!             Your Updated Medication List - Protect others around you: Learn how to safely use, store and throw away your medicines at www.disposemymeds.org.          This list is accurate as of " 8/1/18 10:43 AM.  Always use your most recent med list.                   Brand Name Dispense Instructions for use Diagnosis    cetirizine 10 MG tablet    zyrTEC     Take 10 mg by mouth daily        cycloSPORINE 0.05 % ophthalmic emulsion    RESTASIS    180 each    Place 1 drop into both eyes 2 times daily    Keratitis sicca, bilateral, Dry eyes, bilateral, Blepharitis of upper and lower eyelids of both eyes, unspecified type       etonogestrel-ethinyl estradiol 0.12-0.015 MG/24HR vaginal ring    NUVARING    4 each    Place 1 each vaginally every 28 days    Encounter for other contraceptive management       OMEGA-3 FISH OIL PO           PRENATAL VITAMINS PO           UNABLE TO FIND      MEDICATION NAME: Calcium        VITAMIN D3 PO      Take 1,000 Units by mouth daily

## 2018-08-06 LAB
COPATH REPORT: NORMAL
PAP: NORMAL

## 2018-08-07 LAB
FINAL DIAGNOSIS: ABNORMAL
HPV HR 12 DNA CVX QL NAA+PROBE: NEGATIVE
HPV16 DNA SPEC QL NAA+PROBE: POSITIVE
HPV18 DNA SPEC QL NAA+PROBE: NEGATIVE
SPECIMEN DESCRIPTION: ABNORMAL
SPECIMEN SOURCE CVX/VAG CYTO: ABNORMAL

## 2018-08-24 ENCOUNTER — TELEPHONE (OUTPATIENT)
Dept: OBGYN | Facility: CLINIC | Age: 33
End: 2018-08-24

## 2018-08-24 NOTE — TELEPHONE ENCOUNTER
Patient would like to schedule a colposcopy with Dr. Quinones. Best contact number is [phone]

## 2018-10-16 ENCOUNTER — OFFICE VISIT (OUTPATIENT)
Dept: OBGYN | Facility: CLINIC | Age: 33
End: 2018-10-16
Payer: COMMERCIAL

## 2018-10-16 VITALS — DIASTOLIC BLOOD PRESSURE: 64 MMHG | BODY MASS INDEX: 18.68 KG/M2 | SYSTOLIC BLOOD PRESSURE: 132 MMHG | WEIGHT: 108 LBS

## 2018-10-16 DIAGNOSIS — N87.1 CIN II (CERVICAL INTRAEPITHELIAL NEOPLASIA II): ICD-10-CM

## 2018-10-16 DIAGNOSIS — B97.7 HIGH RISK HPV INFECTION: Primary | ICD-10-CM

## 2018-10-16 LAB — BETA HCG QUAL IFA URINE: NEGATIVE

## 2018-10-16 PROCEDURE — 88305 TISSUE EXAM BY PATHOLOGIST: CPT | Performed by: OBSTETRICS & GYNECOLOGY

## 2018-10-16 PROCEDURE — 57454 BX/CURETT OF CERVIX W/SCOPE: CPT | Performed by: OBSTETRICS & GYNECOLOGY

## 2018-10-16 PROCEDURE — 84703 CHORIONIC GONADOTROPIN ASSAY: CPT | Performed by: OBSTETRICS & GYNECOLOGY

## 2018-10-16 NOTE — PROGRESS NOTES
Gracie Sanches is a 33 year old female  who presents for repeat colposcopy, referred by DR. Olsen. Pap smear 2 months ago showed: NIL with high risk HPV present: 16. The prior pap showed NIL with high risk HPV present: 16.     Patient's last menstrual period was 2018.  UPT today is negative  Patient does not smoke  Type of contraception: none  Age at first sexual intercourse: 17  Number of sexual partners (lifetime): less than 6  Past GYN history:  No STD history and HPV  Prior cervical/vaginal disease: HIRA 1 and HIRA 2.  Prior cervical treatment: no treatment.      Had HPV vaccine series in [place] in .  Wants to know if she should do series again since was only 6 Valent at that time    PROCEDURE:  Before the procedure, it was ensured that the patient was educated regarding the nature of her findings to date, the implications, and what was to be done. She has been made aware of the role of HPV, the natural history of infection, ways to minimize her future risk, the effect of HPV on the cervix, and treatment options available should they be indicated.  The details of the   colposcopic procedure were reviewed.  All questions were answered before proceeding, and informed consent was therefore obtained.    Speculum placed in vagina and excellent visualization of cervix   acheived, cervix swabbed x 3 with acetic acid solution.    FINDINGS:  Cervix: acetowhite area from 3:00 to 9:00 and mosaicism noted at 12:00  Please refer to images section for details.    Pap repeated?:  No  SCJ seen?:  yes    ECC done?:  Yes   Lugol's solution used?:  Yes all stains but above  Satisfactory examination?:  yes    Small cervical biopsy done at 12:00 and endocervical curettage.  Patient had moderate cramping.  EBL 5 cc.  Monsel was used for hemostasis and specimens were submitted to pathology.    ASSESSMENT: HPV related changes and HIRA 1.    PLAN: specimens labelled and sent to Pathology, will base further  treatment on Pathology findings, treatment options discussed with patient and post biopsy instructions given to patient.  Discussed if ECC and biopsy are HIRA-1 or less, plan repeat Pap smear with HPV in 1 year.  Questions were answered.      She will let me know the dates of the HPV vaccine so they can be entered historically.    Belkys Quinones MD

## 2018-10-16 NOTE — MR AVS SNAPSHOT
After Visit Summary   10/16/2018    Gracie Sanches    MRN: [id number]           Patient Information     Date Of Birth          1985        Visit Information        Provider Department      10/16/2018 11:00 AM Belkys Quinones MD [facility]        Today's Diagnoses     High risk HPV infection    -  1    HIRA II (cervical intraepithelial neoplasia II)           Follow-ups after your visit        Who to contact     If you have questions or need follow up information about today's clinic visit or your schedule please contact [facility] directly at [phone].  Normal or non-critical lab and imaging results will be communicated to you by MyChart, letter or phone within 4 business days after the clinic has received the results. If you do not hear from us within 7 days, please contact the clinic through [employer] or phone. If you have a critical or abnormal lab result, we will notify you by phone as soon as possible.  Submit refill requests through [employer] or call your pharmacy and they will forward the refill request to us. Please allow 3 business days for your refill to be completed.          Additional Information About Your Visit        MyChart Information     [employer] gives you secure access to your electronic health record. If you see a primary care provider, you can also send messages to your care team and make appointments. If you have questions, please call your primary care clinic.  If you do not have a primary care provider, please call [phone] and they will assist you.        Care EveryWhere ID     This is your Care EveryWhere ID. This could be used by other organizations to access your [facility] medical records  [id number]        Your Vitals Were     Last Period BMI (Body Mass Index)                09/27/2018 18.68 kg/m2           Blood Pressure from Last 3 Encounters:   10/16/18 132/64   08/01/18 122/70   08/11/17 108/62    Weight from  Last 3 Encounters:   10/16/18 108 lb (49 kg)   08/01/18 112 lb 6.4 oz (51 kg)   08/11/17 114 lb 8 oz (51.9 kg)              We Performed the Following     Beta HCG qual IFA urine     COLP CERVIX/UPPER VAGINA W BX CERVIX/ENDOCERV CURETT     Surgical pathology exam        Primary Care Provider Office Phone # Fax #    Ava Olsen, DEBRA [phone] [address] [place] [address]        Equal Access to Services     RAMONA SEGOVIA : Hadii aad ku hadasho Soomaali, waaxda luqadaha, qaybta kaalmada adeegyada, waxay idiin hayaan erma shin . So [facility] [phone].    ATENCIÓN: Si habla español, tiene a medrano disposición servicios gratuitos de asistencia lingüística. Llame al [phone].    We comply with applicable federal civil rights laws and [place] laws. We do not discriminate on the basis of race, color, national origin, age, disability, sex, sexual orientation, or gender identity.            Thank you!     Thank you for choosing [facility]  for your care. Our goal is always to provide you with excellent care. Hearing back from our patients is one way we can continue to improve our services. Please take a few minutes to complete the written survey that you may receive in the mail after your visit with us. Thank you!             Your Updated Medication List - Protect others around you: Learn how to safely use, store and throw away your medicines at www.disposemymeds.org.          This list is accurate as of 10/16/18 11:32 AM.  Always use your most recent med list.                   Brand Name Dispense Instructions for use Diagnosis    cetirizine 10 MG tablet    zyrTEC     Take 10 mg by mouth daily        cycloSPORINE 0.05 % ophthalmic emulsion    RESTASIS    180 each    Place 1 drop into both eyes 2 times daily    Keratitis sicca, bilateral, Dry eyes, bilateral, Blepharitis of upper and lower eyelids of both eyes, unspecified type        etonogestrel-ethinyl estradiol 0.12-0.015 MG/24HR vaginal ring    NUVARING    4 each    Place 1 each vaginally every 28 days    Encounter for other contraceptive management       OMEGA-3 FISH OIL PO           PRENATAL VITAMINS PO           UNABLE TO FIND      MEDICATION NAME: Calcium        VITAMIN D3 PO      Take 1,000 Units by mouth daily

## 2018-10-16 NOTE — NURSING NOTE
"Chief Complaint   Patient presents with     Colposcopy       Initial /64  Wt 108 lb (49 kg)  LMP 2018  BMI 18.68 kg/m2 Estimated body mass index is 18.68 kg/(m^2) as calculated from the following:    Height as of 18: 5' 3.75\" (1.619 m).    Weight as of this encounter: 108 lb (49 kg).  BP completed using cuff size: regular        The following HM Due: NONE      The following patient reported/Care Every where data was sent to:  P ABSTRACT QUALITY INITIATIVES [25984]        N/a      Rabia Burris MA              "

## 2018-10-18 LAB — COPATH REPORT: NORMAL

## 2019-01-09 ENCOUNTER — TRANSFERRED RECORDS (OUTPATIENT)
Dept: HEALTH INFORMATION MANAGEMENT | Facility: CLINIC | Age: 34
End: 2019-01-09

## 2019-01-11 ENCOUNTER — TRANSFERRED RECORDS (OUTPATIENT)
Dept: HEALTH INFORMATION MANAGEMENT | Facility: CLINIC | Age: 34
End: 2019-01-11

## 2019-01-11 ENCOUNTER — MEDICAL CORRESPONDENCE (OUTPATIENT)
Dept: HEALTH INFORMATION MANAGEMENT | Facility: CLINIC | Age: 34
End: 2019-01-11

## 2019-01-24 ENCOUNTER — DOCUMENTATION ONLY (OUTPATIENT)
Dept: RADIOLOGY | Facility: CLINIC | Age: 34
End: 2019-01-24

## 2019-02-08 ENCOUNTER — ANCILLARY PROCEDURE (OUTPATIENT)
Dept: CT IMAGING | Facility: CLINIC | Age: 34
End: 2019-02-08
Payer: COMMERCIAL

## 2019-02-08 DIAGNOSIS — R59.0 LEFT CERVICAL LYMPHADENOPATHY: ICD-10-CM

## 2019-02-08 RX ORDER — IOPAMIDOL 755 MG/ML
100 INJECTION, SOLUTION INTRAVASCULAR ONCE
Status: COMPLETED | OUTPATIENT
Start: 2019-02-08 | End: 2019-02-08

## 2019-02-08 RX ADMIN — IOPAMIDOL 100 ML: 755 INJECTION, SOLUTION INTRAVASCULAR at 12:25

## 2019-02-08 NOTE — DISCHARGE INSTRUCTIONS

## 2019-02-15 ENCOUNTER — DOCUMENTATION ONLY (OUTPATIENT)
Dept: RADIOLOGY | Facility: CLINIC | Age: 34
End: 2019-02-15

## 2019-02-15 NOTE — PROGRESS NOTES
Neuro Radiology consulted for Singular enlarge left ant cervical lymp node biopsy.(Enlarge lymph nodes)    Updated CT 2/8/19 imaging showed External marker over the left anterolateral  lower neck. At this level there is no mass or lymphadenopathy. There  is an asymmetrically larger jugular vein which might be palpated as a  judson mass. Bilateral submandibular glands and parotid glands are  normal. Thyroid gland is normal. Vessels are patent.    Biopsy Defer   Discussed with Dr. Marie Woo IR [facility]  [phone] Call pager  [phone] pager

## 2019-03-13 ENCOUNTER — MEDICAL CORRESPONDENCE (OUTPATIENT)
Dept: HEALTH INFORMATION MANAGEMENT | Facility: CLINIC | Age: 34
End: 2019-03-13

## 2019-03-13 ENCOUNTER — TRANSFERRED RECORDS (OUTPATIENT)
Dept: HEALTH INFORMATION MANAGEMENT | Facility: CLINIC | Age: 34
End: 2019-03-13

## 2019-04-01 NOTE — TELEPHONE ENCOUNTER
FUTURE VISIT INFORMATION      FUTURE VISIT INFORMATION:    Date: 4/16/19    Time: 9:30am    Location: [facility]  REFERRAL INFORMATION:    Referring provider:  Kelly Mario CNP    Referring providers clinic:  [facility]    Reason for visit/diagnosis:  Rectal bleeding/hemorrhoids    NOTES STATUS DETAILS   OFFICE NOTE from referring provider  Received 1/11/19   OFFICE NOTE from other specialist   N/A    DISCHARGE SUMMARY FROM HOSPITAL N/A    DISCHARGE REPORT FROM ED N/A    OPERATIVE REPORT  N/A    PFC REPORT N/A    MEDICATION LIST Internal    LABS     FIT/STOOL TESTING N/A    ANAL PAP N/A    PATHOLOGY REPORTS RELATED TO DIAGNOSIS N/A    DIAGNOSTIC PROCEDURES     COLONOSCOPY N/A    ENDOSCOPY (EGD) N/A    ERCP N/A    ANOSCOPY N/A    FLEX SIGMOIDOSCOPY N/A    IMAGING & REPORT      CT, MRI, US, XR N/A

## 2019-04-15 ENCOUNTER — TELEPHONE (OUTPATIENT)
Dept: SURGERY | Facility: CLINIC | Age: 34
End: 2019-04-15

## 2019-04-15 ASSESSMENT — ENCOUNTER SYMPTOMS
MUSCLE WEAKNESS: 0
JOINT SWELLING: 0
ABDOMINAL PAIN: 0
POOR WOUND HEALING: 0
NAIL CHANGES: 0
BLOATING: 1
SKIN CHANGES: 0
NAUSEA: 1
NECK PAIN: 1
CONSTIPATION: 0
MYALGIAS: 1
JAUNDICE: 0
MUSCLE CRAMPS: 0
BACK PAIN: 1
ARTHRALGIAS: 0
BLOOD IN STOOL: 1
VOMITING: 0
BOWEL INCONTINENCE: 0
RECTAL PAIN: 0
HEARTBURN: 0
DIARRHEA: 1
STIFFNESS: 0

## 2019-04-16 ENCOUNTER — OFFICE VISIT (OUTPATIENT)
Dept: SURGERY | Facility: CLINIC | Age: 34
End: 2019-04-16
Payer: COMMERCIAL

## 2019-04-16 ENCOUNTER — PRE VISIT (OUTPATIENT)
Dept: SURGERY | Facility: CLINIC | Age: 34
End: 2019-04-16

## 2019-04-16 VITALS
HEART RATE: 66 BPM | WEIGHT: 109.2 LBS | BODY MASS INDEX: 18.64 KG/M2 | HEIGHT: 64 IN | DIASTOLIC BLOOD PRESSURE: 64 MMHG | OXYGEN SATURATION: 99 % | SYSTOLIC BLOOD PRESSURE: 112 MMHG

## 2019-04-16 DIAGNOSIS — K62.5 RECTAL BLEEDING: Primary | ICD-10-CM

## 2019-04-16 ASSESSMENT — PAIN SCALES - GENERAL: PAINLEVEL: NO PAIN (0)

## 2019-04-16 ASSESSMENT — MIFFLIN-ST. JEOR: SCORE: 1191.36

## 2019-04-16 NOTE — LETTER
2019       RE: Gracie Sanches  [address]  [address]  [place] [address]     Dear Colleague,    Thank you for referring your patient, Gracie Sanches, to the [facility] COLON AND RECTAL SURGERY at [facility]. Please see a copy of my visit note below.    Colon and Rectal Surgery Consult Clinic Note    Date: 2019     Referring provider:  Kelly Mario  [address] [address]     RE: Gracie Sanches  : 1985  LIAM: 2019    Gracie Sanches is a very pleasant 33 year old female without a significant past medical history with a recent diagnosis of rectal bleeding.  Given these findings they were subsequently sent to the Colon and Rectal Surgery Clinic for an opinion on this and a new patient consultation.     HPI:  Gracie developed intermittent rectal bleeding in the past 6 months.  Around the same time she noted enlarged inguinal lymph nodes bilaterally.  She thinks she had some intermittent rectal bleeding years ago but this seemed to resolve on its own and she thought was may be due to an anal fissure.  She denies any pain with bowel movements.  The blood is always bright red and typically on the toilet paper but has dripped into the toilet bowl and then a moderate amount on 3 occasions.  She has some mild perianal irritation but again no significant pain.  She is tried hemorrhoid creams but without improvement in bleeding.  Her stools are typically fairly loose but no diarrhea or constipation.  She follows a high-fiber diet.  Blood is never mixed with the stool.  She has some occasional, transient nausea which she states is very mild but no vomiting.  No abdominal pain.  Her weight has been stable.  No family history of any colon cancer.  Father with diverticulitis.  She is never had a colonoscopy.  Hemoglobin has remained stable at 14.7.  Cervical pap positive for HPV 16. Normal colposcopy.  "She is not a smoker.    Physical Examination:  /64 (BP Location: Left arm, Patient Position: Sitting, Cuff Size: Adult Regular)   Pulse 66   Ht 5' 3.75\"   Wt 109 lb 3.2 oz   SpO2 99%   BMI 18.89 kg/m     General: Alert, oriented, in no acute distress, sitting comfortably  HEENT: Mucous membranes moist  Abdomen: Enlarged inguinal lymph nodes palpable bilaterally    Perianal external examination: Exam was chaperoned by NGUYEN Recio  Perianal skin: Intact with no excoriation or lichenification.  Lesions: No evidence of an external lesion, nodularity, or induration in the perianal region.  Eversion of buttocks: There was not evidence of an anal fissure. Details: N/A.  Skin tags or external hemorrhoids: None.  Digital rectal examination: Was performed.   Sphincter tone: Good.  Palpable lesions: No.  Other: None.  Bimanual examination: was not performed    Anoscopy: Was performed.   Hemorrhoids: Yes.  Grade 1-2 internal hemorrhoids without active bleeding  Lesions: No    Assessment/Plan: Given the long-standing intermittent rectal bleeding without pain or constipation as well as some looser stools and palpable inguinal lymph nodes, would recommend a colonoscopy at this time.  If this is normal, could consider further imaging of her inguinal lymph nodes.  In the meantime, recommended she start on a daily fiber supplement to bulk up her stools some. Patient's questions were answered to her stated satisfaction and she is in agreement with this plan.    Medical history:  Past Medical History:   Diagnosis Date     Cervical high risk HPV (human papillomavirus) test positive 05/23/2017, 08/01/18 05/23/17: NIL pap, + HR HPV type 16 result.      HSIL (high grade squamous intraepithelial lesion) on Pap smear of cervix 02/25/2010    02/25/10: HSIL pap (abstracted records)     Lump of right breast 10 o'clock position 12/9/2016     Lump of right breast 10 o'clock position        Surgical history:  Past Surgical " History:   Procedure Laterality Date     BIOPSY  2011    mole removed from foot- benign     COLONOSCOPY  2010     COLPOSCOPY CERVIX, BIOPSY CERVIX, ENDOCERVICAL CURETTAGE, COMBINED  03/11/2010    03/11/10: [place] Bx HIRA 2 (abstracted records)       Problem list:    Patient Active Problem List    Diagnosis Date Noted     HIRA II (cervical intraepithelial neoplasia II) 05/23/2017     Priority: Medium     02/25/10: HSIL pap (abstracted records)  03/11/10: [place] Bx HIRA 2 (abstracted records)  09/30/10: NIL pap (abstracted records)  09/30/17: [place] HIRA I (abstracted records)  04/14/14: NIL pap (abstracted records)  06/10/14: NIL pap    05/23/17: NIL pap, + HR HPV type 16 result. Plan [place].  08/11/17: [place] Bx Focal squamous atypia of undetermined significance.Plan cotest in 1 year.   08/01/18:  NIL pap, + HR HPV type 16 result. Plan [place].   10/16/18: [place] Bx and ECC Neg for dysplasia. Plan cotest in 1 year.        Hordeolum externum, unspecified laterality 12/09/2016     Priority: Medium     Lump of right breast 10 o'clock position 12/09/2016     Priority: Medium     Chronic urinary urgency 06/26/2015     Priority: Medium     worsened with stress          Medications:  Current Outpatient Medications   Medication Sig Dispense Refill     Cholecalciferol (VITAMIN D3 PO) Take 1,000 Units by mouth daily       cycloSPORINE (RESTASIS) 0.05 % ophthalmic emulsion Place 1 drop into both eyes 2 times daily 180 each 1     Omega-3 Fatty Acids (OMEGA-3 FISH OIL PO)        Prenatal Multivit-Min-Fe-FA (PRENATAL VITAMINS PO)        UNABLE TO FIND MEDICATION NAME: Calcium       cetirizine (ZYRTEC) 10 MG tablet Take 10 mg by mouth daily       etonogestrel-ethinyl estradiol (NUVARING) 0.12-0.015 MG/24HR vaginal ring Place 1 each vaginally every 28 days (Patient not taking: Reported on 8/1/2018) 4 each 1       Allergies:  Allergies   Allergen Reactions     Seasonal Allergies Fatigue, Hives, Itching and Other (See Comments)     Augmentin Rash  "      Family history:  Family History   Problem Relation Age of Onset     Cancer Father         ?squamous     Other - See Comments Father         diverticulitis      Hyperlipidemia Father      Other Cancer Father         Skin cancer many years ago, was removed     Skin Cancer Father      Cerebrovascular Disease Paternal Grandfather      Hypertension Paternal Grandfather      Hypertension Paternal Grandmother      Glaucoma Paternal Grandmother      Asthma Sister         execma     Depression Sister      Asthma Sister      Melanoma No family hx of      Macular Degeneration No family hx of      Retinal detachment No family hx of        Social history:  Social History     Tobacco Use     Smoking status: Never Smoker     Smokeless tobacco: Never Used   Substance Use Topics     Alcohol use: Yes     Comment: very infrequent    Marital status: .  Occupation: medical student.    Nursing Notes:   Fabi Tejada EMT  4/16/2019  9:42 AM  Signed  Chief Complaint   Patient presents with     Rectal Problem     Rectal bleeding.       Vitals:    04/16/19 0925   BP: 112/64   BP Location: Left arm   Patient Position: Sitting   Cuff Size: Adult Regular   Pulse: 66   SpO2: 99%   Weight: 109 lb 3.2 oz   Height: 5' 3.75\"       Body mass index is 18.89 kg/m .    NGUYEN Recio     Total face to face time was 20 minutes, >50% counseling.    DEBRA Leach, NP-C  Colon and Rectal Surgery   [facility]    This note was created using speech recognition software and may contain unintended word substitutions.      "

## 2019-04-16 NOTE — NURSING NOTE
"Chief Complaint   Patient presents with     Rectal Problem     Rectal bleeding.       Vitals:    04/16/19 0925   BP: 112/64   BP Location: Left arm   Patient Position: Sitting   Cuff Size: Adult Regular   Pulse: 66   SpO2: 99%   Weight: 109 lb 3.2 oz   Height: 5' 3.75\"       Body mass index is 18.89 kg/m .      Fabi Tejada, EMT                      "

## 2019-04-16 NOTE — PROGRESS NOTES
"Colon and Rectal Surgery Consult Clinic Note    Date: 2019     Referring provider:  Kelly Mario  [place] [id number]     RE: Gracie Sanches  : 1985  LIAM: 2019    Gracie Sanches is a very pleasant 33 year old female without a significant past medical history with a recent diagnosis of rectal bleeding.  Given these findings they were subsequently sent to the Colon and Rectal Surgery Clinic for an opinion on this and a new patient consultation.     HPI:  Gracie developed intermittent rectal bleeding in the past 6 months.  Around the same time she noted enlarged inguinal lymph nodes bilaterally.  She thinks she had some intermittent rectal bleeding years ago but this seemed to resolve on its own and she thought was may be due to an anal fissure.  She denies any pain with bowel movements.  The blood is always bright red and typically on the toilet paper but has dripped into the toilet bowl and then a moderate amount on 3 occasions.  She has some mild perianal irritation but again no significant pain.  She is tried hemorrhoid creams but without improvement in bleeding.  Her stools are typically fairly loose but no diarrhea or constipation.  She follows a high-fiber diet.  Blood is never mixed with the stool.  She has some occasional, transient nausea which she states is very mild but no vomiting.  No abdominal pain.  Her weight has been stable.  No family history of any colon cancer.  Father with diverticulitis.  She is never had a colonoscopy.  Hemoglobin has remained stable at 14.7.  Cervical pap positive for HPV 16. Normal colposcopy. She is not a smoker.    Physical Examination:  /64 (BP Location: Left arm, Patient Position: Sitting, Cuff Size: Adult Regular)   Pulse 66   Ht 5' 3.75\"   Wt 109 lb 3.2 oz   SpO2 99%   BMI 18.89 kg/m    General: Alert, oriented, in no acute distress, sitting comfortably  HEENT: Mucous membranes " moist  Abdomen: Enlarged inguinal lymph nodes palpable bilaterally    Perianal external examination: Exam was chaperoned by NGUYEN Recio  Perianal skin: Intact with no excoriation or lichenification.  Lesions: No evidence of an external lesion, nodularity, or induration in the perianal region.  Eversion of buttocks: There was not evidence of an anal fissure. Details: N/A.  Skin tags or external hemorrhoids: None.  Digital rectal examination: Was performed.   Sphincter tone: Good.  Palpable lesions: No.  Other: None.  Bimanual examination: was not performed    Anoscopy: Was performed.   Hemorrhoids: Yes.  Grade 1-2 internal hemorrhoids without active bleeding  Lesions: No    Assessment/Plan: Given the long-standing intermittent rectal bleeding without pain or constipation as well as some looser stools and palpable inguinal lymph nodes, would recommend a colonoscopy at this time.  If this is normal, could consider further imaging of her inguinal lymph nodes.  In the meantime, recommended she start on a daily fiber supplement to bulk up her stools some. Patient's questions were answered to her stated satisfaction and she is in agreement with this plan.      Medical history:  Past Medical History:   Diagnosis Date     Cervical high risk HPV (human papillomavirus) test positive 05/23/2017, 08/01/18 05/23/17: NIL pap, + HR HPV type 16 result.      HSIL (high grade squamous intraepithelial lesion) on Pap smear of cervix 02/25/2010    02/25/10: HSIL pap (abstracted records)     Lump of right breast 10 o'clock position 12/9/2016     Lump of right breast 10 o'clock position        Surgical history:  Past Surgical History:   Procedure Laterality Date     BIOPSY  2011    mole removed from foot- benign     COLONOSCOPY  2010     COLPOSCOPY CERVIX, BIOPSY CERVIX, ENDOCERVICAL CURETTAGE, COMBINED  03/11/2010    03/11/10: [place] Bx HIRA 2 (abstracted records)       Problem list:    Patient Active Problem List    Diagnosis  Date Noted     HIRA II (cervical intraepithelial neoplasia II) 05/23/2017     Priority: Medium     02/25/10: HSIL pap (abstracted records)  03/11/10: [place] Bx HIRA 2 (abstracted records)  09/30/10: NIL pap (abstracted records)  09/30/17: [place] HIRA I (abstracted records)  04/14/14: NIL pap (abstracted records)  06/10/14: NIL pap    05/23/17: NIL pap, + HR HPV type 16 result. Plan [place].  08/11/17: [place] Bx Focal squamous atypia of undetermined significance.Plan cotest in 1 year.   08/01/18:  NIL pap, + HR HPV type 16 result. Plan [place].   10/16/18: [place] Bx and ECC Neg for dysplasia. Plan cotest in 1 year.        Hordeolum externum, unspecified laterality 12/09/2016     Priority: Medium     Lump of right breast 10 o'clock position 12/09/2016     Priority: Medium     Chronic urinary urgency 06/26/2015     Priority: Medium     worsened with stress          Medications:  Current Outpatient Medications   Medication Sig Dispense Refill     Cholecalciferol (VITAMIN D3 PO) Take 1,000 Units by mouth daily       cycloSPORINE (RESTASIS) 0.05 % ophthalmic emulsion Place 1 drop into both eyes 2 times daily 180 each 1     Omega-3 Fatty Acids (OMEGA-3 FISH OIL PO)        Prenatal Multivit-Min-Fe-FA (PRENATAL VITAMINS PO)        UNABLE TO FIND MEDICATION NAME: Calcium       cetirizine (ZYRTEC) 10 MG tablet Take 10 mg by mouth daily       etonogestrel-ethinyl estradiol (NUVARING) 0.12-0.015 MG/24HR vaginal ring Place 1 each vaginally every 28 days (Patient not taking: Reported on 8/1/2018) 4 each 1       Allergies:  Allergies   Allergen Reactions     Seasonal Allergies Fatigue, Hives, Itching and Other (See Comments)     Augmentin Rash       Family history:  Family History   Problem Relation Age of Onset     Cancer Father         ?squamous     Other - See Comments Father         diverticulitis      Hyperlipidemia Father      Other Cancer Father         Skin cancer many years ago, was removed     Skin Cancer Father      Cerebrovascular  "Disease Paternal Grandfather      Hypertension Paternal Grandfather      Hypertension Paternal Grandmother      Glaucoma Paternal Grandmother      Asthma Sister         execma     Depression Sister      Asthma Sister      Melanoma No family hx of      Macular Degeneration No family hx of      Retinal detachment No family hx of        Social history:  Social History     Tobacco Use     Smoking status: Never Smoker     Smokeless tobacco: Never Used   Substance Use Topics     Alcohol use: Yes     Comment: very infrequent    Marital status: .  Occupation: medical student.    Nursing Notes:   Fabi Tejada EMT  4/16/2019  9:42 AM  Signed  Chief Complaint   Patient presents with     Rectal Problem     Rectal bleeding.       Vitals:    04/16/19 0925   BP: 112/64   BP Location: Left arm   Patient Position: Sitting   Cuff Size: Adult Regular   Pulse: 66   SpO2: 99%   Weight: 109 lb 3.2 oz   Height: 5' 3.75\"       Body mass index is 18.89 kg/m .      NGUYEN Recio                         Total face to face time was 20 minutes, >50% counseling.      DEBRA Leach, NP-C  Colon and Rectal Surgery   [facility]    This note was created using speech recognition software and may contain unintended word substitutions.    "

## 2019-04-26 DIAGNOSIS — K62.5 RECTAL BLEEDING: Primary | ICD-10-CM

## 2019-04-29 ENCOUNTER — TELEPHONE (OUTPATIENT)
Dept: SURGERY | Facility: CLINIC | Age: 34
End: 2019-04-29

## 2019-04-29 NOTE — TELEPHONE ENCOUNTER
Patient is scheduled for surgery with Dr. Herrera      Spoke or left message with: Gracie    Date of Surgery: 5/31/2019    Location: [facility]    Informed patient they will need an adult  Yes    H&P: Scheduled with PCP    Additional imaging/appointments: Post op with Belkys Avitia    Surgery packet: Will be mailed

## 2019-05-01 NOTE — TELEPHONE ENCOUNTER
Marilyn Chand RN Thompson, Parishia S, MA; Carlson, April             Patient needs to reschedule her colonoscopy with Dr. Herrera. She wants to schedule earlier if possible. If not possible to move it up she would like to reschedule to after June 7.     Thanks!      I spoke to the patient she was hoping to get in the week after, but Dr. Herrera has no surgery times available so she is going to stick to her original surgery date.

## 2019-05-09 ENCOUNTER — TELEPHONE (OUTPATIENT)
Dept: SURGERY | Facility: CLINIC | Age: 34
End: 2019-05-09

## 2019-05-09 NOTE — TELEPHONE ENCOUNTER
Return call to patient, inform her patient to check with provider when she has her H&P tomorrow. They will let her know which medication to take and which one she should stop. Patient states understanding. She will call if she has any other questions.    Patti Sherwood LPN

## 2019-05-09 NOTE — TELEPHONE ENCOUNTER
[facility] Call Center    Phone Message    May a detailed message be left on voicemail: yes    Reason for Call: Other: Patient needs to know if it is ok for her to continue her daily medication she takes called Sertraline or does she need to stop this before the colonoscopy. Please follow up with the patient asap.Thank you.     Action Taken: Message routed to:  [facility] ([facility] colon and rectal

## 2019-05-10 ENCOUNTER — TRANSFERRED RECORDS (OUTPATIENT)
Dept: HEALTH INFORMATION MANAGEMENT | Facility: CLINIC | Age: 34
End: 2019-05-10

## 2019-05-20 RX ORDER — ACETAMINOPHEN 325 MG/1
975 TABLET ORAL ONCE
Status: CANCELLED | OUTPATIENT
Start: 2019-05-20 | End: 2019-05-20

## 2019-05-30 ENCOUNTER — PATIENT OUTREACH (OUTPATIENT)
Dept: SURGERY | Facility: CLINIC | Age: 34
End: 2019-05-30

## 2019-05-30 ENCOUNTER — ANESTHESIA EVENT (OUTPATIENT)
Dept: SURGERY | Facility: AMBULATORY SURGERY CENTER | Age: 34
End: 2019-05-30

## 2019-05-30 NOTE — PROGRESS NOTES
Patient was called in regard to her voicemail message. Advised patient about approved clear liquids. Also advised patient it is ok to proceed even if her  has been sick since she has no symptoms.

## 2019-05-31 ENCOUNTER — ANESTHESIA (OUTPATIENT)
Dept: SURGERY | Facility: AMBULATORY SURGERY CENTER | Age: 34
End: 2019-05-31

## 2019-05-31 ENCOUNTER — HOSPITAL ENCOUNTER (OUTPATIENT)
Facility: AMBULATORY SURGERY CENTER | Age: 34
End: 2019-05-31
Attending: COLON & RECTAL SURGERY
Payer: COMMERCIAL

## 2019-05-31 VITALS
HEIGHT: 64 IN | DIASTOLIC BLOOD PRESSURE: 77 MMHG | SYSTOLIC BLOOD PRESSURE: 101 MMHG | TEMPERATURE: 98.8 F | OXYGEN SATURATION: 98 % | BODY MASS INDEX: 18.44 KG/M2 | WEIGHT: 108 LBS | HEART RATE: 87 BPM | RESPIRATION RATE: 16 BRPM

## 2019-05-31 LAB
HCG UR QL: NEGATIVE
INTERNAL QC OK POCT: YES

## 2019-05-31 RX ORDER — ONDANSETRON 2 MG/ML
INJECTION INTRAMUSCULAR; INTRAVENOUS PRN
Status: DISCONTINUED | OUTPATIENT
Start: 2019-05-31 | End: 2019-05-31

## 2019-05-31 RX ORDER — FENTANYL CITRATE 50 UG/ML
25-50 INJECTION, SOLUTION INTRAMUSCULAR; INTRAVENOUS
Status: DISCONTINUED | OUTPATIENT
Start: 2019-05-31 | End: 2019-05-31 | Stop reason: HOSPADM

## 2019-05-31 RX ORDER — LIDOCAINE HYDROCHLORIDE 20 MG/ML
INJECTION, SOLUTION INFILTRATION; PERINEURAL PRN
Status: DISCONTINUED | OUTPATIENT
Start: 2019-05-31 | End: 2019-05-31

## 2019-05-31 RX ORDER — FENTANYL CITRATE 50 UG/ML
INJECTION, SOLUTION INTRAMUSCULAR; INTRAVENOUS PRN
Status: DISCONTINUED | OUTPATIENT
Start: 2019-05-31 | End: 2019-05-31

## 2019-05-31 RX ORDER — ONDANSETRON 4 MG/1
4 TABLET, ORALLY DISINTEGRATING ORAL
Status: DISCONTINUED | OUTPATIENT
Start: 2019-05-31 | End: 2019-06-01 | Stop reason: HOSPADM

## 2019-05-31 RX ORDER — SODIUM CHLORIDE, SODIUM LACTATE, POTASSIUM CHLORIDE, CALCIUM CHLORIDE 600; 310; 30; 20 MG/100ML; MG/100ML; MG/100ML; MG/100ML
INJECTION, SOLUTION INTRAVENOUS CONTINUOUS
Status: DISCONTINUED | OUTPATIENT
Start: 2019-05-31 | End: 2019-05-31 | Stop reason: HOSPADM

## 2019-05-31 RX ORDER — OXYCODONE HYDROCHLORIDE 5 MG/1
5 TABLET ORAL EVERY 4 HOURS PRN
Status: DISCONTINUED | OUTPATIENT
Start: 2019-05-31 | End: 2019-06-01 | Stop reason: HOSPADM

## 2019-05-31 RX ORDER — KETAMINE HYDROCHLORIDE 10 MG/ML
INJECTION, SOLUTION INTRAMUSCULAR; INTRAVENOUS PRN
Status: DISCONTINUED | OUTPATIENT
Start: 2019-05-31 | End: 2019-05-31

## 2019-05-31 RX ORDER — MEPERIDINE HYDROCHLORIDE 25 MG/ML
12.5 INJECTION INTRAMUSCULAR; INTRAVENOUS; SUBCUTANEOUS
Status: DISCONTINUED | OUTPATIENT
Start: 2019-05-31 | End: 2019-06-01 | Stop reason: HOSPADM

## 2019-05-31 RX ORDER — SODIUM CHLORIDE, SODIUM LACTATE, POTASSIUM CHLORIDE, CALCIUM CHLORIDE 600; 310; 30; 20 MG/100ML; MG/100ML; MG/100ML; MG/100ML
INJECTION, SOLUTION INTRAVENOUS CONTINUOUS PRN
Status: DISCONTINUED | OUTPATIENT
Start: 2019-05-31 | End: 2019-05-31

## 2019-05-31 RX ORDER — ONDANSETRON 4 MG/1
4 TABLET, ORALLY DISINTEGRATING ORAL EVERY 30 MIN PRN
Status: DISCONTINUED | OUTPATIENT
Start: 2019-05-31 | End: 2019-06-01 | Stop reason: HOSPADM

## 2019-05-31 RX ORDER — SODIUM CHLORIDE, SODIUM LACTATE, POTASSIUM CHLORIDE, CALCIUM CHLORIDE 600; 310; 30; 20 MG/100ML; MG/100ML; MG/100ML; MG/100ML
INJECTION, SOLUTION INTRAVENOUS CONTINUOUS
Status: DISCONTINUED | OUTPATIENT
Start: 2019-05-31 | End: 2019-06-01 | Stop reason: HOSPADM

## 2019-05-31 RX ORDER — PROPOFOL 10 MG/ML
INJECTION, EMULSION INTRAVENOUS PRN
Status: DISCONTINUED | OUTPATIENT
Start: 2019-05-31 | End: 2019-05-31

## 2019-05-31 RX ORDER — ONDANSETRON 2 MG/ML
4 INJECTION INTRAMUSCULAR; INTRAVENOUS EVERY 30 MIN PRN
Status: DISCONTINUED | OUTPATIENT
Start: 2019-05-31 | End: 2019-06-01 | Stop reason: HOSPADM

## 2019-05-31 RX ORDER — ACETAMINOPHEN 325 MG/1
975 TABLET ORAL ONCE
Status: COMPLETED | OUTPATIENT
Start: 2019-05-31 | End: 2019-05-31

## 2019-05-31 RX ORDER — NALOXONE HYDROCHLORIDE 0.4 MG/ML
.1-.4 INJECTION, SOLUTION INTRAMUSCULAR; INTRAVENOUS; SUBCUTANEOUS
Status: DISCONTINUED | OUTPATIENT
Start: 2019-05-31 | End: 2019-06-01 | Stop reason: HOSPADM

## 2019-05-31 RX ORDER — PROPOFOL 10 MG/ML
INJECTION, EMULSION INTRAVENOUS CONTINUOUS PRN
Status: DISCONTINUED | OUTPATIENT
Start: 2019-05-31 | End: 2019-05-31

## 2019-05-31 RX ORDER — GLYCOPYRROLATE 0.2 MG/ML
INJECTION, SOLUTION INTRAMUSCULAR; INTRAVENOUS PRN
Status: DISCONTINUED | OUTPATIENT
Start: 2019-05-31 | End: 2019-05-31

## 2019-05-31 RX ADMIN — ONDANSETRON 4 MG: 2 INJECTION INTRAMUSCULAR; INTRAVENOUS at 11:15

## 2019-05-31 RX ADMIN — PROPOFOL 40 MG: 10 INJECTION, EMULSION INTRAVENOUS at 10:46

## 2019-05-31 RX ADMIN — LIDOCAINE HYDROCHLORIDE 60 MG: 20 INJECTION, SOLUTION INFILTRATION; PERINEURAL at 11:27

## 2019-05-31 RX ADMIN — PROPOFOL 30 MG: 10 INJECTION, EMULSION INTRAVENOUS at 10:50

## 2019-05-31 RX ADMIN — ACETAMINOPHEN 975 MG: 325 TABLET ORAL at 08:56

## 2019-05-31 RX ADMIN — KETAMINE HYDROCHLORIDE 20 MG: 10 INJECTION, SOLUTION INTRAMUSCULAR; INTRAVENOUS at 11:05

## 2019-05-31 RX ADMIN — FENTANYL CITRATE 25 MCG: 50 INJECTION, SOLUTION INTRAMUSCULAR; INTRAVENOUS at 10:57

## 2019-05-31 RX ADMIN — GLYCOPYRROLATE 0.1 MG: 0.2 INJECTION, SOLUTION INTRAMUSCULAR; INTRAVENOUS at 11:04

## 2019-05-31 RX ADMIN — SODIUM CHLORIDE, SODIUM LACTATE, POTASSIUM CHLORIDE, CALCIUM CHLORIDE: 600; 310; 30; 20 INJECTION, SOLUTION INTRAVENOUS at 08:56

## 2019-05-31 RX ADMIN — PROPOFOL 20 MG: 10 INJECTION, EMULSION INTRAVENOUS at 10:48

## 2019-05-31 RX ADMIN — PROPOFOL 30 MG: 10 INJECTION, EMULSION INTRAVENOUS at 10:57

## 2019-05-31 RX ADMIN — PROPOFOL 100 MCG/KG/MIN: 10 INJECTION, EMULSION INTRAVENOUS at 10:43

## 2019-05-31 RX ADMIN — PROPOFOL 20 MG: 10 INJECTION, EMULSION INTRAVENOUS at 10:54

## 2019-05-31 RX ADMIN — FENTANYL CITRATE 25 MCG: 50 INJECTION, SOLUTION INTRAMUSCULAR; INTRAVENOUS at 10:59

## 2019-05-31 RX ADMIN — SODIUM CHLORIDE, SODIUM LACTATE, POTASSIUM CHLORIDE, CALCIUM CHLORIDE: 600; 310; 30; 20 INJECTION, SOLUTION INTRAVENOUS at 10:37

## 2019-05-31 ASSESSMENT — MIFFLIN-ST. JEOR: SCORE: 1184.88

## 2019-05-31 NOTE — ANESTHESIA PREPROCEDURE EVALUATION
Anesthesia Pre-Procedure Evaluation    Patient: Gracie Sanches   MRN:     [id number] Gender:   female   Age:    34 year old :      1985        Preoperative Diagnosis: Rectal Bleeding   Procedure(s):  Examination Under Anesthesia  Colonoscopy     Past Medical History:   Diagnosis Date     Cervical high risk HPV (human papillomavirus) test positive 2017, 18: NIL pap, + HR HPV type 16 result.      HSIL (high grade squamous intraepithelial lesion) on Pap smear of cervix 2010    02/25/10: HSIL pap (abstracted records)     Lump of right breast 10 o'clock position 2016     Lump of right breast 10 o'clock position       Past Surgical History:   Procedure Laterality Date     BIOPSY      mole removed from foot- benign     COLONOSCOPY       COLPOSCOPY CERVIX, BIOPSY CERVIX, ENDOCERVICAL CURETTAGE, COMBINED  2010    03/11/10: [place] Bx HIRA 2 (abstracted records)          Anesthesia Evaluation     . Pt has had prior anesthetic. Type: MAC    No history of anesthetic complications          ROS/MED HX    ENT/Pulmonary:  - neg pulmonary ROS     Neurologic:  - neg neurologic ROS     Cardiovascular:  - neg cardiovascular ROS       METS/Exercise Tolerance:  >4 METS   Hematologic:  - neg hematologic  ROS       Musculoskeletal:  - neg musculoskeletal ROS       GI/Hepatic:     (+) Other GI/Hepatic (Rectal bleeding)       Renal/Genitourinary:  - ROS Renal section negative       Endo:  - neg endo ROS       Psychiatric:  - neg psychiatric ROS       Infectious Disease:  - neg infectious disease ROS       Malignancy:      - no malignancy   Other:    - neg other ROS                     PHYSICAL EXAM:   Mental Status/Neuro: A/A/O   Airway: Facies: Feasible  Mallampati: I  Mouth/Opening: Full  TM distance: > 6 cm  Neck ROM: Full   Respiratory:    CV:    Comments:                    Lab Results   Component Value Date    WBC 5.6 2016    HGB 13.8 2017    HCT 40.7 2016     " 11/02/2016    GLC 99 05/23/2017    TSH 1.18 06/26/2015    HCG Negative 05/31/2019       Preop Vitals  BP Readings from Last 3 Encounters:   05/31/19 122/79   04/16/19 112/64   10/16/18 132/64    Pulse Readings from Last 3 Encounters:   05/31/19 87   04/16/19 66   08/01/18 79      Resp Readings from Last 3 Encounters:   05/31/19 16   12/09/16 12   11/02/16 12    SpO2 Readings from Last 3 Encounters:   05/31/19 99%   04/16/19 99%   05/23/17 100%      Temp Readings from Last 1 Encounters:   05/31/19 36.7  C (98.1  F) (Oral)    Ht Readings from Last 1 Encounters:   05/31/19 1.626 m (5' 4\")      Wt Readings from Last 1 Encounters:   05/31/19 49 kg (108 lb)    Estimated body mass index is 18.54 kg/m  as calculated from the following:    Height as of this encounter: 1.626 m (5' 4\").    Weight as of this encounter: 49 kg (108 lb).     LDA:  Peripheral IV 05/31/19 Left Lower forearm (Active)   Site Assessment WDL 5/31/2019  9:00 AM   Line Status Infusing 5/31/2019  9:00 AM   Phlebitis Scale 0-->no symptoms 5/31/2019  9:00 AM   Number of days: 0            Assessment:   ASA SCORE: 1    NPO Status: > 2 hours since completed Clear Liquids; > 6 hours since completed Solid Foods   Documentation: H&P complete; Preop Testing complete; Consents complete   Proceeding: Proceed without further delay  Tobacco Use:  NO Active use of Tobacco/UNKNOWN Tobacco use status     Plan:   Anes. Type:  MAC   Pre-Induction: Midazolam IV   Induction:  IV (Standard)   Airway: Native Airway   Access/Monitoring: PIV   Maintenance: Propofol; IV   Emergence: Procedure Site   Logistics: Same Day Surgery     Postop Pain/Sedation Strategy:  Standard-Options: Opioids PRN     PONV Management:  Adult Risk Factors: Female, Non-Smoker, Postop Opioids  Prevention: Propofol Infusion; Ondansetron     CONSENT: Direct conversation   Plan and risks discussed with: Patient   Blood Products: Consent Deferred (Minimal Blood Loss)                         Conor " JEET Lopes MD

## 2019-05-31 NOTE — ANESTHESIA POSTPROCEDURE EVALUATION
Anesthesia POST Procedure Evaluation    Patient: Gracie Sanches   MRN:     [id number] Gender:   female   Age:    34 year old :      1985        Preoperative Diagnosis: Rectal Bleeding   Procedure(s):  Examination Under Anesthesia  Colonoscopy   Postop Comments: No value filed.       Anesthesia Type:  MAC  No value filed.    Reportable Event: NO     PAIN: Uncomplicated   Sign Out status: Comfortable, Well controlled pain     PONV: No PONV   Sign Out status:  No Nausea or Vomiting     Neuro/Psych: Uneventful perioperative course   Sign Out Status: Preoperative baseline; Age appropriate mentation     Airway/Resp.: Uneventful perioperative course   Sign Out Status: Non labored breathing, age appropriate RR; Resp. Status within EXPECTED Parameters     CV: Uneventful perioperative course   Sign Out status: Appropriate BP and perfusion indices; Appropriate HR/Rhythm     Disposition:   Sign Out in:  Phase II  Disposition:  Home  Recovery Course: Uneventful  Follow-Up: Not required           Last Anesthesia Record Vitals:  CRNA VITALS  2019 1052 - 2019 1152      2019             Resp Rate (set):  10          Last PACU Vitals:  Vitals Value Taken Time   BP     Temp     Pulse     Resp     SpO2     Temp src     NIBP 101/53 2019 11:19 AM   Pulse 114 2019 11:20 AM   SpO2 99 % 2019 11:20 AM   Resp     Temp     Ht Rate 114 2019 11:20 AM   Temp 2           Electronically Signed By: Conor Lopes MD, May 31, 2019, 12:23 PM

## 2019-05-31 NOTE — OP NOTE
"Procedure Date: 05/31/2019.      PREOPERATIVE DIAGNOSES:   1.  Need for colorectal cancer screening.   2.  Hematochezia.   3.  Bilateral inguinal lymphadenopathy.      POSTOPERATIVE DIAGNOSES:   1.  Need for colorectal cancer screening.   2.  Hematochezia.   3.  Bilateral inguinal lymphadenopathy.      ANESTHESIA:  MAC.      PROCEDURE:  Colonoscopy with CO2 insufflation.      SURGEON:  Chiki Davis MD      ASSISTANT:  None.      DESCRIPTION OF OPERATION:  After obtaining informed consent, the patient was brought to the operating room and placed in the left lateral position.  MAC anesthesia was gently induced without difficulty.  A \"timeout\" was performed.  The perianal exam showed no evidence of rash, lesions, or large external hemorrhoids.  Digital rectal exam did not show any palpable masses.  There was no visible anal fissure.  A pediatric colonoscope was then passed transanally and was gently brought all the way to the cecum without difficulty.  The quality of the prep was good.  The appendiceal orifice as well as the ileocecal valve were documented with endoscopic pictures.  The terminal ileum was cannulated and appeared normal.  The colonoscope was then withdrawn over the course of 6 minutes from the cecum down to the rectum, and the entire colon and rectum that was visualized appeared normal.  There was no evidence of inflammation, ulceration, or neoplasia.  Retroflexion was performed in the rectum and this revealed grade 1 internal hemorrhoids.  The colonoscope was then removed.  The patient tolerated the procedure well.      COMPLICATIONS:  None immediately.      ESTIMATED BLOOD LOSS:  None.      REPLACEMENT:  300 mL of crystalloid.      SPECIMENS:  Fecal material specimen collected for MNMiCROS (research protocol).      FINDINGS:  Normal colonoscopy.  Would recommend next colonoscopy at age 47.      DISPOSITION:  PACU.         CHIKI DAVIS MD             D: 05/31/2019   T: 05/31/2019   MT: " LJ      Name:     YUSRA MAYFIELD   MRN:      [id number]        Account:        [id number]   :      1985           Procedure Date: 2019      Document: [id number]       cc: Ava Rowley MSN, NP-C       P Surgery Billing

## 2019-05-31 NOTE — DISCHARGE INSTRUCTIONS
[facility]  Home Care Following Anesthesia  For 24 hours after surgery:  1. Get plenty of rest.  A responsible adult must stay with you for at least 24 hours after you leave the surgery center.  2. Do not drive or use heavy equipment.  If you have weakness or tingling, don't drive or use heavy equipment until this feeling goes away.   3. Do not drink alcohol.   4. Avoid strenuous or risky activities.  Ask for help when climbing stairs.  5. You may feel lightheaded.  IF so, sit for a few minutes before standing.  Have someone help you get up.   6. If you have nausea (feel sick to your stomach): Drink only clear liquids such as apple juice, ginger ale, broth or 7-Up.  Rest may also help.  Be sure to drink enough fluids.  Move to a regular diet as you feel able.   7. You may have a slight fever.  Call the doctor if your fever is over 100 F (37.7 C) (taken under the tongue) or lasts longer than 24 hours.  8. You may have a dry mouth, a sore throat, muscle aches or trouble sleeping. These should go away after 24 hours.  9. Do not make important or legal decisions.               Tips for taking pain medications  To get the best pain relief possible, remember these points:    Take pain medications as directed, before pain becomes severe.    Pain medication can upset your stomach: taking it with food may help.    Constipation is a common side effect of pain medication. Drink plenty of  fluids.    Eat foods high in fiber. Take a stool softener if recommended by your doctor or pharmacist.    Do not drink alcohol, drive or operate machinery while taking pain medications.    Ask about other ways to control pain, such as with heat, ice or relaxation.    Tylenol/Acetaminophen Consumption  To help encourage the safe use of acetaminophen, the makers of TYLENOL  have lowered the maximum daily dose for single-ingredient Extra Strength TYLENOL  (acetaminophen) products sold in the [place]  from 8 pills per day (4,000 mg) to 6 pills per day (3,000 mg). The dosing interval has also changed from 2 pills every 4-6 hours to 2 pills every 6 hours.    If you feel your pain relief is insufficient, you may take Tylenol/Acetaminophen in addition to your narcotic pain medication.     Be careful not to exceed 3,000 mg of Tylenol/Acetaminophen in a 24 hour period from all sources.    If you are taking extra strength Tylenol/acetaminophen (500 mg), the maximum dose is 6 tablets in 24 hours.    If you are taking regular strength acetaminophen (325 mg), the maximum dose is 9 tablets in 24 hours.    Call a doctor for any of the followin. Signs of infection (fever, growing tenderness at the surgery site, a large amount of drainage or bleeding, severe pain, foul-smelling drainage, redness, swelling).  2. It has been over 8 to 10 hours since surgery and you are still not able to urinate (pass water).  3. Headache for over 24 hours.  4. Numbness, tingling or weakness the day after surgery (if you had spinal anesthesia).  Your doctor is:  Dr. Chiki Herrera, Colon Rectal: [phone]                    Or dial [phone] and ask for the resident on call for:  Colon Rectal  For emergency care, call the:  [facility]:  [phone] (TTY for hearing impaired: [phone])            Anorectal Surgery Instructions    What can I expect after anorectal surgery?  Most anorectal procedures are done as outpatient surgery, and you go home the same day as the procedure. A few surgical procedures will require that you stay in the hospital for about one to three days. No matter where the procedure is done or how long or short it takes, these recommendations will help you heal and feel more comfortable.    Medicines:  The anal area is very sensitive; you can expect to have some pain for up to 2-4 weeks after the procedure. Your doctor will give you a prescription for one or more pain medications.    Take  naprosyn 500 mg twice a day OR ibuprofen 600 mg four times a day     Take this on a regular basis (not as needed) following your surgery.     The drugs are best taken with food.  Do not take if it causes stomach upset or if you have a history of ulcers or gastritis. You can stop the naprosyn (or ibuprofen) or reduce the dose when you are feeling better.    DO NOT use naprosyn, ibuprofen, or other similar agents (eg. Advil or Aleve) if you have inflammatory bowel disease (Ulcerative Colitis or Crohn's disease) or if your doctor as advised you against using these medications    Take acetominaphen (Tylenol) 650-1000 mg four times a day.     Take this on a regular basis (not as needed) following surgery for pain control.     Take the lower dose if you are >65 years old or have liver disease. The maximum dose of acetominaphen is 4000 mg a day. You can stop the acetaminophen or reduce the dose when you are feeling better.    It is important to realize that many narcotic pain relievers (including vicodin, percocet, tylenol #3) also have acetaminophen, and excessive doses of acetaminophen can be dangerous, so do not take these in addition to acetominaphen.  You may take narcotics that don't contain acetominaphen such as oxycodone.      Take oxycodone AS NEEDED in addition to the acetominaphen and naprosyn.      Because narcotics have side effects (including constipation), you should reduce your use of these medications as tolerated as your pain improves.    *In general, the best strategy is to take (if you are able to tolerate it) the tylenol and naprosen on a regular basis until your pain has largely gone away. You can take the narcotic pain medicine as needed in addition to the tylenol and ibuprofen. As your pain begins to lessen, you should cut back on your narcotic use while continuing to take your regular tylenol and naprosyn doses.      Refilling prescriptions. If you need additional pain medication, please call the  triage nurse at [phone] during normal business hours (8 a.m. to 4 p.m., Monday though Friday) or have your pharmacy fax a refill request to [phone]. If you call after hours or on the weekends, the doctor on call may not know you personally and may not renew narcotic pain medication by phone. Call your primary care provider for all other medication refills.    Perineal care:  External gauze dressing can be removed the morning after surgery. If you have an adhesive dressing stuck to the incision, DO NOT remove this.   Tub baths:    If possible, take a tub bath immediately after each bowel movement.     Baths should be take at least 3 times daily for the first week to 10 days following your procedure. You should soak in the tub for 10 to 15 minutes each time with water as warm as you can tolerate.     Even after you go back to work, it is a good idea to sit in the tub in the morning, after returning from work, and again in the evening before bedtime.    Bleeding/Infection:    You can expect to have some bleeding after bowel movements, but it should stop soon after you wipe.     Use a wet cloth or perianal pad (Tucks or Preparation H pads) to gently wipe the area after each bowel movement.    Do not rub the anal area or use a lot of pressure.    Using a spray bottle filled with warm water helps loosen any remaining stool. Blot gently with a soft dry cloth or tissue paper.    Infection around the anal opening is not very common. The anal area has excellent blood supply, which helps the area to heal. Bloody discharge after bowel movements is normal and may last 2 to 4 weeks after your surgery. However, if you bleed between bowel movements and cannot get it to stop, call the triage nurse immediately [phone].    Bowel function:  Take a fiber supplement such as Metamucil, which is over the counter. It is important to drink six to eight glasses of water or juice everyday when using fiber products.    If you do  not have a bowel movement after 1-2 days:    Take Milk of Magnesia-2 tablespoons.       If there are no results, repeat this or add over the counter Miralax.      If you still do not have results, contact the clinic.     If there are no results, repeat this. Stop taking Milk of Magnesia or other laxatives if you begin to have diarrhea.    * Constipation will cause you to strain when you have a bowel movement. The hard stool will be difficult to pass, will increase pain and bleeding, and will slow down healing.  Try to avoid constipation and/or diarrhea as this can make the pain and bleeding worse.    * It is important to have regular bowel movements at least every other day and to keep your stool soft.  A high fiber diet, including at least four servings of fruits or vegetables daily, will help to keep your bowel movements regular and soft.    Activity:  After your procedure, there are no restrictions on your activity     except restrictions surrounding being on narcotics and in pain, such as no heavy machine operating or driving.     You may walk, climb stairs, ride in a car, and sit as tolerated.     It is helpful to avoid sitting in one position for long periods (2 or more hours).    After some surgeries, you may be told not to perform any lifting (more than 10 pounds) for several weeks after surgery.    When to call:  When do I need to call the doctor or triage nurse?    If you experience any of the problems listed here, call our triage nurse during business hours ([phone]).     The nurse will help you with your problem or have the doctor call you.     After hours and on weekends, please call the main hospital number ([phone]) and ask for the colon and rectal surgery person on call.     Some is available to help you 24 hours a day, seven days a week.    Call for:   ? Fever greater than 101 degrees   ? Chills   ? Foul-smelling drainage   ? Nausea and vomiting   ? Diarrhea - greater than 3 water stools  in 24 hours   ? Constipation - no bowel movement after 3 days   ? Severe bleeding that does not stop soon after a bowel movement   ? Problems with the incision, including increased pain, swelling, or redness

## 2019-05-31 NOTE — ANESTHESIA CARE TRANSFER NOTE
Patient: Gracie Sanches    Procedure(s):  Examination Under Anesthesia  Colonoscopy    Diagnosis: Rectal Bleeding  Diagnosis Additional Information: No value filed.    Anesthesia Type:   No value filed.     Note:  Airway :Room Air  Patient transferred to:Phase II  Comments: Arrive Phase II, Stable, Airway Intact  96/46, 120,16,97%,97.2  All questions answered.Handoff Report: Identifed the Patient, Identified the Reponsible Provider, Reviewed the pertinent medical history, Discussed the surgical course, Reviewed Intra-OP anesthesia mangement and issues during anesthesia, Set expectations for post-procedure period and Allowed opportunity for questions and acknowledgement of understanding      Vitals: (Last set prior to Anesthesia Care Transfer)    CRNA VITALS  5/31/2019 1052 - 5/31/2019 1126      5/31/2019             Resp Rate (set):  10                Electronically Signed By: DEBRA Hood CRNA  May 31, 2019  11:26 AM

## 2019-06-11 ENCOUNTER — TELEPHONE (OUTPATIENT)
Dept: SURGERY | Facility: CLINIC | Age: 34
End: 2019-06-11

## 2019-06-11 NOTE — TELEPHONE ENCOUNTER
Left message for patient letting her know that per Belkys Key NP, if patient has already been evaluated by IR regarding her nodes, we don't need to do any further imaging. Left direct number for pt to call back with any questions.    Patti Sherwood, VIOLETN

## 2019-09-10 ENCOUNTER — OFFICE VISIT (OUTPATIENT)
Dept: OBGYN | Facility: CLINIC | Age: 34
End: 2019-09-10
Payer: COMMERCIAL

## 2019-09-10 VITALS
BODY MASS INDEX: 18.27 KG/M2 | SYSTOLIC BLOOD PRESSURE: 114 MMHG | DIASTOLIC BLOOD PRESSURE: 80 MMHG | HEIGHT: 64 IN | WEIGHT: 107 LBS

## 2019-09-10 DIAGNOSIS — R87.810 CERVICAL HIGH RISK HUMAN PAPILLOMAVIRUS (HPV) DNA TEST POSITIVE: ICD-10-CM

## 2019-09-10 DIAGNOSIS — Z01.419 ENCOUNTER FOR GYNECOLOGICAL EXAMINATION WITHOUT ABNORMAL FINDING: Primary | ICD-10-CM

## 2019-09-10 DIAGNOSIS — N92.6 IRREGULAR MENSES: ICD-10-CM

## 2019-09-10 DIAGNOSIS — N87.1 CIN II (CERVICAL INTRAEPITHELIAL NEOPLASIA II): ICD-10-CM

## 2019-09-10 LAB
ESTRADIOL SERPL-MCNC: 34 PG/ML
FSH SERPL-ACNC: 7.7 IU/L
LH SERPL-ACNC: 4 IU/L
PROLACTIN SERPL-MCNC: 14 UG/L (ref 3–27)

## 2019-09-10 PROCEDURE — 83002 ASSAY OF GONADOTROPIN (LH): CPT | Performed by: OBSTETRICS & GYNECOLOGY

## 2019-09-10 PROCEDURE — 36415 COLL VENOUS BLD VENIPUNCTURE: CPT | Performed by: OBSTETRICS & GYNECOLOGY

## 2019-09-10 PROCEDURE — 84443 ASSAY THYROID STIM HORMONE: CPT | Performed by: OBSTETRICS & GYNECOLOGY

## 2019-09-10 PROCEDURE — 84146 ASSAY OF PROLACTIN: CPT | Performed by: OBSTETRICS & GYNECOLOGY

## 2019-09-10 PROCEDURE — 99000 SPECIMEN HANDLING OFFICE-LAB: CPT | Performed by: OBSTETRICS & GYNECOLOGY

## 2019-09-10 PROCEDURE — 82670 ASSAY OF TOTAL ESTRADIOL: CPT | Performed by: OBSTETRICS & GYNECOLOGY

## 2019-09-10 PROCEDURE — 99395 PREV VISIT EST AGE 18-39: CPT | Performed by: OBSTETRICS & GYNECOLOGY

## 2019-09-10 PROCEDURE — 87624 HPV HI-RISK TYP POOLED RSLT: CPT | Performed by: OBSTETRICS & GYNECOLOGY

## 2019-09-10 PROCEDURE — 88175 CYTOPATH C/V AUTO FLUID REDO: CPT | Performed by: OBSTETRICS & GYNECOLOGY

## 2019-09-10 PROCEDURE — 83001 ASSAY OF GONADOTROPIN (FSH): CPT | Performed by: OBSTETRICS & GYNECOLOGY

## 2019-09-10 PROCEDURE — 83520 IMMUNOASSAY QUANT NOS NONAB: CPT | Mod: 90 | Performed by: OBSTETRICS & GYNECOLOGY

## 2019-09-10 ASSESSMENT — MIFFLIN-ST. JEOR: SCORE: 1180.35

## 2019-09-10 NOTE — PROGRESS NOTES
Gracie is a 34 year old  female who presents for annual exam.     Menses are regular q 28-30 days and normal lasting 4 days.  Menses flow: normal.  Patient's last menstrual period was 2019.. Using none for contraception.  She is currently considering pregnancy.  Besides routine health maintenance,  she would like to discuss trying to get pregnant.  That stopped her NuvaRing 2018 and she and her spouse have been trying for pregnancy since then.  He is 47 years old with no prior children, works in [employer].  Cycles are regular.  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  Had work-up for a probable lymph node in her neck that turned out to be prominent jugular vein.  Was found to have bilateral groin lymph nodes and wants those examined today.  Has not noted any changes.  GYNECOLOGIC HISTORY:  Menarche:   Gracie is sexually active with 1male partner(s) and is currently in monogamous relationship.    History sexually transmitted infections:HPV  STI testing offered?  Declined  ROSIO exposure: Unknown  History of abnormal Pap smear: NO - age 30- 65 PAP every 3 years recommended  Family history of breast CA: No  Family history of uterine/ovarian CA: No    Family history of colon CA: No    HEALTH MAINTENANCE:  Cholesterol: (  Cholesterol   Date Value Ref Range Status   2017 163 <200 mg/dL Final   06/10/2014 151 <200 mg/dL Final     Comment:     LDL Cholesterol is the primary guide to therapy.   The NCEP recommends further evaluation of: patients with cholesterol greater   than 200 mg/dL if additional risk factors are present, cholesterol greater   than   240 mg/dL, triglycerides greater than 150 mg/dL, or HDL less than 40 mg/dL.      History of abnormal lipids: No  Mammo: na . History of abnormal Mammo: Not applicable.  Regular Self Breast Exams: Yes  Calcium/Vitamin D intake: source:  dairy, dietary supplement(s) Adequate? Yes  TSH: (  TSH   Date Value Ref  Range Status   2015 1.18 0.40 - 4.00 mU/L Final    )  Pap; (  Lab Results   Component Value Date    PAP NIL 2018    PAP NIL 2017    PAP NIL 06/10/2014    )    HISTORY:  OB History    Para Term  AB Living   0 0 0 0 0 0   SAB TAB Ectopic Multiple Live Births   0 0 0 0 0     Past Medical History:   Diagnosis Date     Cervical high risk HPV (human papillomavirus) test positive 2017, 18: NIL pap, + HR HPV type 16 result.      HSIL (high grade squamous intraepithelial lesion) on Pap smear of cervix 2010    02/25/10: HSIL pap (abstracted records)     Lump of right breast 10 o'clock position 2016     Lump of right breast 10 o'clock position      Past Surgical History:   Procedure Laterality Date     BIOPSY      mole removed from foot- benign     COLONOSCOPY       COLONOSCOPY N/A 2019    Procedure: Colonoscopy;  Surgeon: Chiki Herrera MD;  Location: [facility] OR     COLPOSCOPY CERVIX, BIOPSY CERVIX, ENDOCERVICAL CURETTAGE, COMBINED  2010    03/11/10: [place] Bx HIRA 2 (abstracted records)     EXAM UNDER ANESTHESIA ANUS N/A 2019    Procedure: Examination Under Anesthesia;  Surgeon: Chiki Herrera MD;  Location: [facility] OR     Family History   Problem Relation Age of Onset     Cancer Father         ?squamous     Other - See Comments Father         diverticulitis      Hyperlipidemia Father      Other Cancer Father         Skin cancer many years ago, was removed     Skin Cancer Father      Cerebrovascular Disease Paternal Grandfather      Hypertension Paternal Grandfather      Hypertension Paternal Grandmother      Glaucoma Paternal Grandmother      Asthma Sister         execma     Depression Sister      Asthma Sister      Melanoma No family hx of      Macular Degeneration No family hx of      Retinal detachment No family hx of      Social History     Socioeconomic History     Marital status:      Spouse name: Not on file      Number of children: 0     Years of education: Not on file     Highest education level: Not on file   Occupational History     Employer: [employer]UDENT   Social Needs     Financial resource strain: Not on file     Food insecurity:     Worry: Not on file     Inability: Not on file     Transportation needs:     Medical: Not on file     Non-medical: Not on file   Tobacco Use     Smoking status: Never Smoker     Smokeless tobacco: Never Used   Substance and Sexual Activity     Alcohol use: Yes     Comment: very infrequent     Drug use: No     Sexual activity: Yes     Partners: Male   Lifestyle     Physical activity:     Days per week: Not on file     Minutes per session: Not on file     Stress: Not on file   Relationships     Social connections:     Talks on phone: Not on file     Gets together: Not on file     Attends [employer] service: Not on file     Active member of club or organization: Not on file     Attends meetings of clubs or organizations: Not on file     Relationship status: Not on file     Intimate partner violence:     Fear of current or ex partner: Not on file     Emotionally abused: Not on file     Physically abused: Not on file     Forced sexual activity: Not on file   Other Topics Concern     Parent/sibling w/ CABG, MI or angioplasty before 65F 55M? No   Social History Narrative     Not on file       Current Outpatient Medications:      Multiple Vitamins-Minerals (MULTIVITAMIN PO), , Disp: , Rfl:      cetirizine (ZYRTEC) 10 MG tablet, Take 10 mg by mouth daily, Disp: , Rfl:      Cholecalciferol (VITAMIN D3 PO), Take 1,000 Units by mouth daily, Disp: , Rfl:      cycloSPORINE (RESTASIS) 0.05 % ophthalmic emulsion, Place 1 drop into both eyes 2 times daily, Disp: 180 each, Rfl: 1     Omega-3 Fatty Acids (OMEGA-3 FISH OIL PO), , Disp: , Rfl:      sertraline (ZOLOFT) 50 MG tablet, Take 50 mg by mouth daily, Disp: , Rfl:      UNABLE TO FIND, MEDICATION NAME: Calcium, Disp: , Rfl:      Allergies   Allergen Reactions  "    Seasonal Allergies Fatigue, Hives, Itching and Other (See Comments)     Augmentin Rash       Past medical, surgical, social and family history were reviewed and updated in EPIC.      EXAM:  /80   Ht 1.626 m (5' 4\")   Wt 48.5 kg (107 lb)   LMP 09/07/2019   BMI 18.37 kg/m     BMI: Body mass index is 18.37 kg/m .  Constitutional: healthy, alert and no distress  Head: Normocephalic. No masses, lesions, tenderness or abnormalities  Neck: Neck supple. Trachea midline. No adenopathy. Thyroid symmetric, normal size.   Cardiovascular: RRR.   Respiratory: Negative.   Breast: Breasts reveal mild symmetric fibrocystic densities, but there are no dominant, discrete, fixed or suspicious masses found.  Gastrointestinal: Abdomen soft, non-tender, non-distended. No masses, organomegaly.  :  Vulva:  No external lesions, normal female hair distribution, no inguinal adenopathy--very small nodes felt but all <1 cm, reassured  Urethra:  Midline, non-tender, well supported, no discharge  Vagina:  Moist, pink, no abnormal discharge, no lesions  Uterus:  Normal size , non-tender, freely mobile  Ovaries:  No masses appreciated, non-tender, mobile  Rectal Exam: deferred  Musculoskeletal: extremities normal  Skin: no suspicious lesions or rashes  Psychiatric: Affect appropriate, cooperative,mentation appears normal.     COUNSELING:   Reviewed preventive health counseling, as reflected in patient instructions       Family planning       Folic Acid Counseling   reports that she has never smoked. She has never used smokeless tobacco.    Body mass index is 18.37 kg/m .    FRAX Risk Assessment    ASSESSMENT:  34 year old female with satisfactory annual exam  (Z01.419) Encounter for gynecological examination without abnormal finding  (primary encounter diagnosis)  Comment: Trying for pregnancy  Plan: Discussed to get semen analysis given spouse is 47 with no prior children and this could be male factor.  If semen analysis is normal, " would do hysterosalpingogram and check progesterone level for ovulation.    (N92.6) Irregular menses  Comment: Cycle day 3  Plan: Lutropin, Follicle stimulating hormone, TSH         with free T4 reflex, Prolactin, Estradiol,         Anti-Mullerian hormone        Check labs today and will let her know results.    (R87.810) Cervical high risk human papillomavirus (HPV) DNA test positive  Comment: Last Pap nil with HPV type 16  Plan: HPV High Risk Types DNA Cervical, Pap imaged         thin layer diagnostic with HPV (select HPV         order below)        Discussed if still has HPV 16, would need another colposcopy.  Questions were answered.    Belkys Quinones MD

## 2019-09-11 LAB — TSH SERPL DL<=0.005 MIU/L-ACNC: 1.69 MU/L (ref 0.4–4)

## 2019-09-12 LAB — MIS SERPL-MCNC: 1.09 NG/ML (ref 0.18–11.71)

## 2019-09-13 LAB
COPATH REPORT: NORMAL
PAP: NORMAL

## 2019-11-19 ENCOUNTER — OFFICE VISIT (OUTPATIENT)
Dept: OBGYN | Facility: CLINIC | Age: 34
End: 2019-11-19
Payer: COMMERCIAL

## 2019-11-19 VITALS
BODY MASS INDEX: 18.27 KG/M2 | DIASTOLIC BLOOD PRESSURE: 71 MMHG | SYSTOLIC BLOOD PRESSURE: 107 MMHG | WEIGHT: 107 LBS | HEIGHT: 64 IN

## 2019-11-19 DIAGNOSIS — B97.7 HIGH RISK HPV INFECTION: Primary | ICD-10-CM

## 2019-11-19 LAB — HCG UR QL: NEGATIVE

## 2019-11-19 PROCEDURE — 57455 BIOPSY OF CERVIX W/SCOPE: CPT | Performed by: OBSTETRICS & GYNECOLOGY

## 2019-11-19 PROCEDURE — 88305 TISSUE EXAM BY PATHOLOGIST: CPT | Performed by: OBSTETRICS & GYNECOLOGY

## 2019-11-19 PROCEDURE — 81025 URINE PREGNANCY TEST: CPT | Performed by: OBSTETRICS & GYNECOLOGY

## 2019-11-19 ASSESSMENT — MIFFLIN-ST. JEOR: SCORE: 1180.35

## 2019-11-19 NOTE — PROGRESS NOTES
Gracie Sanches is a 34 year old year old female  who presents for repeat colposcopy, referred. Pap smear on 9/10/19 showed: normal and with high risk HPV present: 16. The prior pap showed normal and with high risk HPV present: 16.       No LMP recorded.  UPT today is negative  Patient does not smoke  Type of contraception: none  Age at first sexual intercourse: 17  Number of sexual partners (lifetime): less than 6  Past GYN history: HPV  Prior cervical/vaginal disease: HIRA 1 and HIRA 2.  Prior cervical treatment: no treatment.    PROCEDURE:    Before the procedure, it was ensured that the patient was educated regarding the nature of her findings to date, the implications, and what was to be done. She has been made aware of the role of HPV, the natural history of infection, ways to minimize her future risk, the effect of HPV on the cervix, and treatment options available should they be indicated. The details of the colposcopic procedure were reviewed. All questions were answered before proceeding, and informed consent was therefore obtained.    Speculum placed in vagina and excellent visualization of cervix acheived, cervix swabbed x 3 with acetic acid solution.    FINDINGS:  Cervix: acetowhite area(s) at 6:00  Please refer to images section for details.  SCJ seen?: yes   ECC done?: No  Satisfactory examination?: yes    Small cervical biopsy done at 6:00 and submitted to pathology.  Patient tolerated well with minimal EBL.  Hemostasis achieved with silver nitrate.    ASSESSMENT: HPV related changes.  PLAN: specimens labelled and sent to Pathology, will base further treatment on Pathology findings, treatment options discussed with patient and post biopsy instructions given to patient.  If biopsy is normal or HIRA-1, plan repeat Pap smear with HPV testing in 1 year.    Belkys Quinones MD

## 2019-11-19 NOTE — PATIENT INSTRUCTIONS

## 2019-11-21 LAB — COPATH REPORT: NORMAL

## 2020-01-07 DIAGNOSIS — Z01.812 PRE-PROCEDURE LAB EXAM: Primary | ICD-10-CM

## 2020-01-07 DIAGNOSIS — N92.6 IRREGULAR MENSES: ICD-10-CM

## 2020-01-08 ENCOUNTER — HOSPITAL ENCOUNTER (OUTPATIENT)
Dept: GENERAL RADIOLOGY | Facility: CLINIC | Age: 35
Discharge: HOME | End: 2020-01-08
Attending: OBSTETRICS & GYNECOLOGY | Admitting: OBSTETRICS & GYNECOLOGY
Payer: COMMERCIAL

## 2020-01-08 DIAGNOSIS — N92.6 IRREGULAR MENSES: ICD-10-CM

## 2020-01-08 DIAGNOSIS — Z01.812 PRE-PROCEDURE LAB EXAM: ICD-10-CM

## 2020-01-08 LAB
HCG UR QL: NEGATIVE
PROGEST SERPL-MCNC: 0.3 NG/ML

## 2020-01-08 PROCEDURE — 81025 URINE PREGNANCY TEST: CPT | Performed by: OBSTETRICS & GYNECOLOGY

## 2020-01-08 PROCEDURE — 74740 X-RAY FEMALE GENITAL TRACT: CPT

## 2020-01-08 PROCEDURE — 58340 CATHETER FOR HYSTEROGRAPHY: CPT | Performed by: OBSTETRICS & GYNECOLOGY

## 2020-01-08 PROCEDURE — 84144 ASSAY OF PROGESTERONE: CPT | Performed by: OBSTETRICS & GYNECOLOGY

## 2020-01-08 PROCEDURE — 25000128 H RX IP 250 OP 636: Performed by: OBSTETRICS & GYNECOLOGY

## 2020-01-08 PROCEDURE — 36415 COLL VENOUS BLD VENIPUNCTURE: CPT | Performed by: OBSTETRICS & GYNECOLOGY

## 2020-01-08 RX ORDER — IOPAMIDOL 510 MG/ML
50 INJECTION, SOLUTION INTRAVASCULAR ONCE
Status: DISCONTINUED | OUTPATIENT
Start: 2020-01-08 | End: 2020-01-09 | Stop reason: HOSPADM

## 2020-01-08 RX ORDER — IOPAMIDOL 612 MG/ML
15 INJECTION, SOLUTION INTRATHECAL ONCE
Status: COMPLETED | OUTPATIENT
Start: 2020-01-08 | End: 2020-01-08

## 2020-01-08 RX ADMIN — IOPAMIDOL 4 ML: 612 INJECTION, SOLUTION INTRAVENOUS at 11:18

## 2020-01-08 NOTE — PROGRESS NOTES
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  The patient is seen today for a hysterosalpingogram to discern tubal patency.   UPT:  negative   Procedure:  After written informed consent was obtained, the patient was placed in dorsal lithotomy on the fluoroscopy table. A bimanual exam was done which revealed normal anteverted uterus.    A speculum was placed in the vagina and the cervix was prepped with hibiclense.  An acorn cannula was placed into the cervical os.  Under fluoroscopy, the Isovue dye was injected. Total of 4 ml.   There was immediate fill and spill from the left tube and eventual fill and spill from the right tube.  Images were reviewed with the patient.   The cannula and speculum were removed. The cervix was hemostatic with minimal EBL.  The patient tolerated the procedure well and was discharged to home.     Bina Ramsey MD

## 2020-01-23 DIAGNOSIS — N92.6 IRREGULAR MENSES: ICD-10-CM

## 2020-01-23 LAB — PROGEST SERPL-MCNC: 11.8 NG/ML

## 2020-01-23 PROCEDURE — 36415 COLL VENOUS BLD VENIPUNCTURE: CPT | Performed by: OBSTETRICS & GYNECOLOGY

## 2020-01-23 PROCEDURE — 84144 ASSAY OF PROGESTERONE: CPT | Performed by: OBSTETRICS & GYNECOLOGY

## 2020-05-21 ENCOUNTER — VIRTUAL VISIT (OUTPATIENT)
Dept: OBGYN | Facility: CLINIC | Age: 35
End: 2020-05-21
Payer: COMMERCIAL

## 2020-05-21 DIAGNOSIS — N92.6 IRREGULAR MENSES: Primary | ICD-10-CM

## 2020-05-21 PROCEDURE — 99213 OFFICE O/P EST LOW 20 MIN: CPT | Mod: 95 | Performed by: OBSTETRICS & GYNECOLOGY

## 2020-05-21 RX ORDER — ACETAMINOPHEN 325 MG/1
325-650 TABLET ORAL EVERY 6 HOURS PRN
COMMUNITY

## 2020-05-21 RX ORDER — OMEGA-3 FATTY ACIDS/FISH OIL 300-1000MG
200 CAPSULE ORAL EVERY 4 HOURS PRN
COMMUNITY

## 2020-05-21 SDOH — HEALTH STABILITY: MENTAL HEALTH: HOW OFTEN DO YOU HAVE 6 OR MORE DRINKS ON ONE OCCASION?: NEVER

## 2020-05-21 SDOH — HEALTH STABILITY: MENTAL HEALTH: HOW MANY STANDARD DRINKS CONTAINING ALCOHOL DO YOU HAVE ON A TYPICAL DAY?: 1 OR 2

## 2020-05-21 SDOH — HEALTH STABILITY: MENTAL HEALTH: HOW OFTEN DO YOU HAVE A DRINK CONTAINING ALCOHOL?: MONTHLY OR LESS

## 2020-05-21 NOTE — PROGRESS NOTES
"Gracie Sanches is a 35 year old female who is being evaluated via a billable telephone visit.      The patient has been notified of following:     \"This telephone visit will be conducted via a call between you and your physician/provider. We have found that certain health care needs can be provided without the need for a physical exam.  This service lets us provide the care you need with a short phone conversation.  If a prescription is necessary we can send it directly to your pharmacy.  If lab work is needed we can place an order for that and you can then stop by our lab to have the test done at a later time.    Telephone visits are billed at different rates depending on your insurance coverage. During this emergency period, for some insurers they may be billed the same as an in-person visit.  Please reach out to your insurance provider with any questions.    If during the course of the call the physician/provider feels a telephone visit is not appropriate, you will not be charged for this service.\"    Patient has given verbal consent for Telephone visit?  Yes    What phone number would you like to be contacted at? [phone]    How would you like to obtain your AVS? Lee     Last seen in sept 2020. Since then SA normal and HSG normal. Over the winter has been doing OPK and tracking on luiz and having regular intercourse, ulysses around ovulation.   +D12 and menses day 27  +D12 and menses day 28      Cycles are every 27-30 days, lasting 3-4 days. Day or 2 prior can have spotting prior to cycle. Now off sertraline. Feeling well.    No birthcontrol since spring 2018, so trying for 2 years. LMP 5/16 so cycle day 6 today.     (N92.6) Irregular menses  (primary encounter diagnosis)  Comment: No pregnancy for 2 years  Plan: US Pelvic Complete w Transvaginal        We will check pelvic ultrasound the cycle to see if endometrium is normal and has follicle present.    Discussed if does not achieve pregnancy this " cycle, we could try a trial of letrozole.Discussed trying Letrozole for ovulation induction.  Not FDA approved, but limited studies showing pregnancy rate higher than clomid ulysses. When clomid has failed in PCOS patients.  Aromatase inhibitors are generally well tolerated. The main side effects are hot flushes and gastrointestinal events (nausea and vomiting), headache, back pain, and leg cramps.  Discussed could proceed with this, or go onto [facility]/reproductive endocrine at this point.  Patient would like to try Letrozol 5mg days 3-7 of cycle.  Told to take it at same time each night and will call with menses to schedule follicle check about day 12-14 of cycle.  Start LH testing day 11.  Questions answered.  Will call/email with side effects or questions. HCG bump discussed.  Since COVID is going on, told her I will put her name on a list and do orders once restrictions are lifted.  She is thinking July...      Phone call duration: 19 minutes    DNEYS JOHNSON MD

## 2020-05-21 NOTE — PATIENT INSTRUCTIONS
Cycle instructions:    The first day of bleeding/period is called Day 1.    Letrozole is taken Days 3-7 of cycle, about same time each day.  Take the medication even if you are still bleeding.    Call when you get your period to schedule an ultrasound to check for follicles on your ovaries.  This should be done about Day 12-14 of cycle.  (we do not have ultrasound on the weekends)  The phone number is [phone] and listen to prompts for the surgery and ultrasound .      Start testing for ovulation using the store bought ovulation predictor kits on Day 11 of cycle.  When you get a positive surge, you will most likely be ovulating within the next 24 hours.       Test the urine about the same time each day.  (should try to test between 2-4 pm, empty bladder about noon)         The test is positive when you see 2 dark solid lines, or a smiley face.  (one faint line and one dark line is NOT positive)         Once the test is positive, you can stop testing.  Have sex/intercourse the day the test is positive.  The next day, have sex again.    Schedule a lab only appointment for about 7-9 days after your ovulation test is positive to check the progesterone level and make sure ovulation occurred.    Make appointment to come back to clinic for infertility follow-up visit about Day 24-26 of cycle so we can review all of the labs and ultrasound results and make dose adjustments if needed.     You can also do an E Visit if you are a my chart patient.   Start the visit by telling me your LMP (last menstrual period), any side effects of letrozole and cycle day of your LH surge.    If you don't have a period by 15 days after LH kit is positive (surge) then do urine pregnancy test and call/email clinic if positive.      Basic info:  The ovulation predictor kits are found in the condom/tampon section of the store.  Clear blue easy brand is simple to read.  Most women will get a positive LH surge before day 20 of the  cycle.  Letrozole is not FDA approved, but limited studies showing pregnancy rate higher than clomid especially when clomid has failed.  Aromatase inhibitors are generally well tolerated. The main side effects are hot flushes and gastrointestinal events (nausea and vomiting), headache, back pain, and leg cramps.  Take it at same time each night to minimize side effects.  There is a chance of twins when taking letrozole as well as making too many cysts on the ovaries.  Do not use lubricants with intercourse when trying to get pregnant. (Pre-Seed, etc is okay)      Schedule an ultrasound for about day 12-14 of the cycle and see if you have a 2 cm follicle on the ovary and about 9 mm endometrium.  Typically follicles grow 2 mm/day so we can  when ovulation should be happening/ready based on the ultrasound and sometimes may need a repeat ultrasound done a few days after last ultrasound to confirm this is happening.  IF it looks like you should be ovulating but have not surged on your own (+OPK), would do an HCG bump to trigger ovulation.      If we do HCG bump, recommend nightly prometrium 200 mg by mouth to support luteal phase.  If no menses 15 days from HCG shot, do pregnancy test and call/email if positive.  If you are pregnant we will continue prometrium until about 10 weeks along.   We will also schedule an early ultrasound for about 7-8 weeks.   DO NOT do pregnancy test before 2 weeks after the HCG shot as it will often be a false positive.    If pregnancy test is negative, then stop prometrium.

## 2020-05-26 ENCOUNTER — ANCILLARY PROCEDURE (OUTPATIENT)
Dept: ULTRASOUND IMAGING | Facility: CLINIC | Age: 35
End: 2020-05-26
Attending: OBSTETRICS & GYNECOLOGY
Payer: COMMERCIAL

## 2020-05-26 DIAGNOSIS — N92.6 IRREGULAR MENSES: ICD-10-CM

## 2020-05-26 PROCEDURE — 76830 TRANSVAGINAL US NON-OB: CPT | Performed by: RADIOLOGY

## 2020-05-26 PROCEDURE — 76856 US EXAM PELVIC COMPLETE: CPT | Mod: 59 | Performed by: RADIOLOGY

## 2020-07-21 ENCOUNTER — TELEPHONE (OUTPATIENT)
Dept: OBGYN | Facility: CLINIC | Age: 35
End: 2020-07-21

## 2020-07-21 NOTE — TELEPHONE ENCOUNTER
Pt was rescheduled to go to [facility] on 7/24 for a follicle check but was originally scheduled to be here at [place] on 7/23. If Radha does come in on Thursday, can we call her and move the appointment back to [place] so that way she can easily get her HCG boost.

## 2020-07-24 ENCOUNTER — ANCILLARY PROCEDURE (OUTPATIENT)
Dept: ULTRASOUND IMAGING | Facility: CLINIC | Age: 35
End: 2020-07-24
Attending: OBSTETRICS & GYNECOLOGY
Payer: COMMERCIAL

## 2020-07-24 ENCOUNTER — ALLIED HEALTH/NURSE VISIT (OUTPATIENT)
Dept: NURSING | Facility: CLINIC | Age: 35
End: 2020-07-24
Attending: OBSTETRICS & GYNECOLOGY
Payer: COMMERCIAL

## 2020-07-24 DIAGNOSIS — N92.6 IRREGULAR MENSES: ICD-10-CM

## 2020-07-24 DIAGNOSIS — N92.6 IRREGULAR MENSES: Primary | ICD-10-CM

## 2020-07-24 PROCEDURE — 76856 US EXAM PELVIC COMPLETE: CPT | Performed by: OBSTETRICS & GYNECOLOGY

## 2020-07-24 PROCEDURE — 76830 TRANSVAGINAL US NON-OB: CPT | Performed by: OBSTETRICS & GYNECOLOGY

## 2020-07-24 PROCEDURE — 96372 THER/PROPH/DIAG INJ SC/IM: CPT

## 2020-07-24 RX ADMIN — CHORIONIC GONADOTROPIN 10000 UNITS: KIT at 16:00

## 2020-07-24 NOTE — PROGRESS NOTES
Pt here for hcg trigger injection.  Has not noted positive ovulation.  Discussed timing of intercourse, starting progesterone, delaying pregnancy test for 2 weeks, and requesting an e-visit with Dr. Quinones prior to the end of the cycle.  Pt understands all instructions and will call if she has any questions/concerns.  Sho Corbin RN-BSN

## 2020-07-29 RX ORDER — CHORIONIC GONADOTROPIN 10000 UNIT
10000 KIT INTRAMUSCULAR ONCE
Status: COMPLETED | OUTPATIENT
Start: 2020-07-29 | End: 2020-07-24

## 2020-12-20 ENCOUNTER — HEALTH MAINTENANCE LETTER (OUTPATIENT)
Age: 35
End: 2020-12-20

## 2021-01-06 ENCOUNTER — OFFICE VISIT (OUTPATIENT)
Dept: OBGYN | Facility: CLINIC | Age: 36
End: 2021-01-06
Payer: COMMERCIAL

## 2021-01-06 VITALS
BODY MASS INDEX: 19.63 KG/M2 | HEART RATE: 115 BPM | HEIGHT: 64 IN | DIASTOLIC BLOOD PRESSURE: 68 MMHG | OXYGEN SATURATION: 96 % | SYSTOLIC BLOOD PRESSURE: 113 MMHG | WEIGHT: 115 LBS

## 2021-01-06 DIAGNOSIS — R87.810 CERVICAL HIGH RISK HUMAN PAPILLOMAVIRUS (HPV) DNA TEST POSITIVE: ICD-10-CM

## 2021-01-06 DIAGNOSIS — Z01.419 ENCOUNTER FOR GYNECOLOGICAL EXAMINATION WITHOUT ABNORMAL FINDING: Primary | ICD-10-CM

## 2021-01-06 PROCEDURE — 87624 HPV HI-RISK TYP POOLED RSLT: CPT | Performed by: OBSTETRICS & GYNECOLOGY

## 2021-01-06 PROCEDURE — 99395 PREV VISIT EST AGE 18-39: CPT | Performed by: OBSTETRICS & GYNECOLOGY

## 2021-01-06 ASSESSMENT — MIFFLIN-ST. JEOR: SCORE: 1211.64

## 2021-01-06 NOTE — PROGRESS NOTES
Gracie is a 35 year old  female who presents for annual exam.     Menses are regular q 23-26 days and normal lasting 3 days.  Menses flow: normal.  Patient's last menstrual period was 2020.. Using none for contraception.  She is currently considering pregnancy.  Besides routine health maintenance, she has no other health concerns today . Is going to start FP residency in 2021, doing her match list now.  Plans to stay here since her parents are in town.  Spouse had a concussion this last year so they have not been preventing pregnancy but it has been on the back burner.  Has an appointment at [facility] next week and may need repeat labs done.  Did get her first dose of the Covid vaccine already.  GYNECOLOGIC HISTORY:  Menarche:   Gracie is sexually active with 1male partner(s) and is currently in monogamous relationship.    History sexually transmitted infections:HPV  STI testing offered?  yes  ROSIO exposure: Unknown  History of abnormal Pap smear: YES - updated in Problem List and Health Maintenance accordingly  Family history of breast CA: No  Family history of uterine/ovarian CA: No    Family history of colon CA: No    HEALTH MAINTENANCE:  Cholesterol: (  Cholesterol   Date Value Ref Range Status   2017 163 <200 mg/dL Final   06/10/2014 151 <200 mg/dL Final     Comment:     LDL Cholesterol is the primary guide to therapy.   The NCEP recommends further evaluation of: patients with cholesterol greater   than 200 mg/dL if additional risk factors are present, cholesterol greater   than   240 mg/dL, triglycerides greater than 150 mg/dL, or HDL less than 40 mg/dL.      History of abnormal lipids: No  Mammo: na . History of abnormal Mammo: Not applicable.  Regular Self Breast Exams: Yes  Calcium/Vitamin D intake: source:  dairy, prenatal Adequate? Yes  TSH: (  TSH   Date Value Ref Range Status   09/10/2019 1.69 0.40 - 4.00 mU/L Final    )  Pap; (  Lab Results   Component Value Date    PAP NIL  09/10/2019    PAP NIL 2018    PAP NIL 2017    )    HISTORY:  OB History    Para Term  AB Living   0 0 0 0 0 0   SAB TAB Ectopic Multiple Live Births   0 0 0 0 0     Past Medical History:   Diagnosis Date     Cervical high risk HPV (human papillomavirus) test positive 2017, 18, 9/10/19    See problem list     HSIL (high grade squamous intraepithelial lesion) on Pap smear of cervix 2010    02/25/10: HSIL pap (abstracted records)     Lump of right breast 10 o'clock position 2016     Lump of right breast 10 o'clock position      Past Surgical History:   Procedure Laterality Date     BIOPSY      mole removed from foot- benign     COLONOSCOPY       COLONOSCOPY N/A 2019    Procedure: Colonoscopy;  Surgeon: Chiki Herrera MD;  Location: [facility] OR     COLPOSCOPY CERVIX, BIOPSY CERVIX, ENDOCERVICAL CURETTAGE, COMBINED  2010, 19    see problem list     EXAM UNDER ANESTHESIA ANUS N/A 2019    Procedure: Examination Under Anesthesia;  Surgeon: Chiki Herrera MD;  Location: [facility] OR     XR HYSTEROSALPINGOGRAM  2020    normal fill and spill     Family History   Problem Relation Age of Onset     Cancer Father         ?squamous     Other - See Comments Father         diverticulitis      Hyperlipidemia Father      Other Cancer Father         Skin cancer many years ago, was removed     Skin Cancer Father      Cerebrovascular Disease Paternal Grandfather      Hypertension Paternal Grandfather      Hypertension Paternal Grandmother      Glaucoma Paternal Grandmother      Asthma Sister         execma     Depression Sister      Asthma Sister      Melanoma No family hx of      Macular Degeneration No family hx of      Retinal detachment No family hx of      Social History     Socioeconomic History     Marital status:      Spouse name: Not on file     Number of children: 0     Years of education: Not on file     Highest education level:  Not on file   Occupational History     Employer: [employer]UDENT   Social Needs     Financial resource strain: Not on file     Food insecurity     Worry: Not on file     Inability: Not on file     Transportation needs     Medical: Not on file     Non-medical: Not on file   Tobacco Use     Smoking status: Never Smoker     Smokeless tobacco: Never Used   Substance and Sexual Activity     Alcohol use: Yes     Frequency: Monthly or less     Drinks per session: 1 or 2     Binge frequency: Never     Comment: very infrequent     Drug use: No     Sexual activity: Yes     Partners: Male   Lifestyle     Physical activity     Days per week: Not on file     Minutes per session: Not on file     Stress: Not on file   Relationships     Social connections     Talks on phone: Not on file     Gets together: Not on file     Attends Yazdanism service: Not on file     Active member of club or organization: Not on file     Attends meetings of clubs or organizations: Not on file     Relationship status: Not on file     Intimate partner violence     Fear of current or ex partner: Not on file     Emotionally abused: Not on file     Physically abused: Not on file     Forced sexual activity: Not on file   Other Topics Concern     Parent/sibling w/ CABG, MI or angioplasty before 65F 55M? No   Social History Narrative     Not on file       Current Outpatient Medications:      acetaminophen (TYLENOL) 325 MG tablet, Take 325-650 mg by mouth every 6 hours as needed for mild pain, Disp: , Rfl:      Cholecalciferol (VITAMIN D3 PO), Take 1,000 Units by mouth daily, Disp: , Rfl:      cycloSPORINE (RESTASIS) 0.05 % ophthalmic emulsion, Place 1 drop into both eyes 2 times daily, Disp: 180 each, Rfl: 1     ibuprofen (ADVIL/MOTRIN) 200 MG capsule, Take 200 mg by mouth every 4 hours as needed for fever, Disp: , Rfl:      Multiple Vitamins-Minerals (MULTIVITAMIN PO), , Disp: , Rfl:      Omega-3 Fatty Acids (OMEGA-3 FISH OIL PO), , Disp: , Rfl:      Prenatal Vit-Fe  "Fumarate-FA (PRENATAL PO), , Disp: , Rfl:      Allergies   Allergen Reactions     Seasonal Allergies Fatigue, Hives, Itching and Other (See Comments)     Augmentin Rash       Past medical, surgical, social and family history were reviewed and updated in [facility].    EXAM:  /68   Pulse 115   Ht 1.626 m (5' 4\")   Wt 52.2 kg (115 lb)   LMP 12/20/2020   SpO2 96%   BMI 19.74 kg/m     BMI: Body mass index is 19.74 kg/m .  Constitutional: healthy, alert and no distress  Head: Normocephalic. No masses, lesions, tenderness or abnormalities  Neck: Neck supple. Trachea midline. No adenopathy. Thyroid symmetric, normal size.   Cardiovascular: RRR.   Respiratory: Negative.   Breast: Breasts reveal mild symmetric fibrocystic densities, but there are no dominant, discrete, fixed or suspicious masses found.  Gastrointestinal: Abdomen soft, non-tender, non-distended. No masses, organomegaly.  :  Vulva:  No external lesions, normal female hair distribution, no inguinal adenopathy.    Urethra:  Midline, non-tender, well supported, no discharge  Vagina:  Moist, pink, no abnormal discharge, no lesions  Uterus:  Normal size , non-tender, freely mobile  Ovaries:  No masses appreciated, non-tender, mobile  Rectal Exam: deferred  Musculoskeletal: extremities normal  Skin: no suspicious lesions or rashes  Psychiatric: Affect appropriate, cooperative,mentation appears normal.     COUNSELING:   Reviewed preventive health counseling, as reflected in patient instructions       Family planning   reports that she has never smoked. She has never used smokeless tobacco.    Body mass index is 19.74 kg/m .    FRAX Risk Assessment    ASSESSMENT:  35 year old female with satisfactory annual exam  (Z01.419) Encounter for gynecological examination without abnormal finding  (primary encounter diagnosis)  Comment: no birthcontrol  Plan: Told her to let me know if needs any labs ordered for fertility because we can do those here.  With through her " residency starting, she is not sure how much will be done now versus later.    (R87.810) Cervical high risk human papillomavirus (HPV) DNA test positive  Comment: Long history of abnormal Paps  Plan: HPV High Risk Types DNA Cervical, Pap imaged         thin layer diagnostic with HPV (select HPV         order below)        Discussed if this Pap is again normal with HPV type 16, has been the same since 2017 and we have never found anything more than ASCUS on colposcopy.  Would be up to her if she wants a repeat Pap smear with HPV in 1 year versus colposcopy.  Questions were answered during this discussion.    Belkys Quinones MD

## 2021-01-08 LAB
COPATH REPORT: NORMAL
PAP: NORMAL

## 2021-01-11 LAB
FINAL DIAGNOSIS: NORMAL
HPV HR 12 DNA CVX QL NAA+PROBE: NEGATIVE
HPV16 DNA SPEC QL NAA+PROBE: NEGATIVE
HPV18 DNA SPEC QL NAA+PROBE: NEGATIVE
SPECIMEN DESCRIPTION: NORMAL
SPECIMEN SOURCE CVX/VAG CYTO: NORMAL

## 2021-01-12 ENCOUNTER — PATIENT OUTREACH (OUTPATIENT)
Dept: OBGYN | Facility: CLINIC | Age: 36
End: 2021-01-12

## 2021-01-12 PROBLEM — N87.1 CIN II (CERVICAL INTRAEPITHELIAL NEOPLASIA II): Status: ACTIVE | Noted: 2017-05-23

## 2021-10-03 ENCOUNTER — HEALTH MAINTENANCE LETTER (OUTPATIENT)
Age: 36
End: 2021-10-03

## 2021-12-22 ENCOUNTER — PATIENT OUTREACH (OUTPATIENT)
Dept: OBGYN | Facility: CLINIC | Age: 36
End: 2021-12-22
Payer: COMMERCIAL

## 2021-12-22 DIAGNOSIS — N87.1 CIN II (CERVICAL INTRAEPITHELIAL NEOPLASIA II): ICD-10-CM

## 2022-01-18 NOTE — TELEPHONE ENCOUNTER
Patient due for Pap and HPV.    Reminder done today via telephone call-spoke to the pt she has the phone number to schedule.

## 2022-02-18 NOTE — TELEPHONE ENCOUNTER
Ethan Quinones,   Patient is lost to pap tracking follow-up. Attempts to contact pt have been made per reminder process and there has been no reply and/or no appt scheduled.       Pap Hx:  02/25/10: HSIL pap   03/11/10: [facility] HIRA 2   09/30/10: NIL pap   09/30/17: [facility] HIRA I   04/14/14: NIL pap   all above abstracted records  06/10/14: NIL pap    05/23/17: NIL pap, + HR HPV type 16 result. Plan [facility].  08/11/17: [facility]x Focal squamous atypia of undetermined significance.Plan cotest in 1 year.   08/01/18:  NIL pap, + HR HPV type 16 result. Plan [facility].   10/16/18: [facility] and ECC Neg for dysplasia. Plan cotest in 1 year.   9/10/19 NIL pap, + HR HPV 16. Plan colp.   11/19/19 colp. bx WNL. Plan: cotest 1 year  1/6/21 NIL Pap, Neg HR HPV. Plan cotest in 1 year, due 1/6/22. Results and recommendations released to the pt thru mychart. Pt viewed.  12/22/21 Reminder Mychart  01/18/22 Reminder call-spoke to the pt she has the phone number to schedule.  02/18/22 Lost to follow-up for pap tracking, analisa routed to provider

## 2022-03-20 ENCOUNTER — HEALTH MAINTENANCE LETTER (OUTPATIENT)
Age: 37
End: 2022-03-20

## 2022-09-10 ENCOUNTER — HEALTH MAINTENANCE LETTER (OUTPATIENT)
Age: 37
End: 2022-09-10

## 2023-04-30 ENCOUNTER — HEALTH MAINTENANCE LETTER (OUTPATIENT)
Age: 38
End: 2023-04-30

## 2024-07-07 ENCOUNTER — HEALTH MAINTENANCE LETTER (OUTPATIENT)
Age: 39
End: 2024-07-07

## (undated) DEVICE — ENDO TUBING CO2 SMARTCAP STERILE DISP 100145CO2EXT

## (undated) DEVICE — SOL WATER IRRIG 1000ML BOTTLE 2F7114

## (undated) DEVICE — GLOVE PROTEXIS BLUE W/NEU-THERA 8.0  2D73EB80

## (undated) DEVICE — GOWN ISOLATION YELLOW XL NOT STERILE 3111PG

## (undated) DEVICE — KIT CONNECTOR FOR OLYMPUS ENDOSCOPES DEFENDO 100310

## (undated) DEVICE — JELLY LUBRICATING SURGILUBE 2OZ TUBE

## (undated) DEVICE — SOL WATER IRRIG 500ML BOTTLE 2F7113

## (undated) DEVICE — STRAP KNEE/BODY 31143004

## (undated) DEVICE — ENDO CAP AND TUBING STERILE FOR ENDOGATOR  100130

## (undated) DEVICE — SOL BENZOIN 0.5OZ

## (undated) DEVICE — SUCTION MANIFOLD NEPTUNE 2 SYS 1 PORT 702-025-000

## (undated) DEVICE — PAD CHUX UNDERPAD 30X30"

## (undated) DEVICE — LINEN TOWEL PACK X6 WHITE 5487

## (undated) DEVICE — GLOVE PROTEXIS MICRO 7.5  2D73PM75

## (undated) DEVICE — PANTIES MESH LG/XLG 2PK 706M2

## (undated) DEVICE — TAPE MEDIPORE 4"X10YD 2964

## (undated) DEVICE — TAPE DURAPORE 3" SILK 1538-3

## (undated) DEVICE — WIPE PREMOIST CLEANSING WASHCLOTHS 7988

## (undated) DEVICE — RX BACITRACIN OINTMENT 0.9G 1/32OZ CUR001109

## (undated) DEVICE — TUBING SUCTION 12"X1/4" N612

## (undated) DEVICE — LINEN TOWEL PACK X5 5464

## (undated) DEVICE — PACK RECTAL KIT CUSTOM ASC

## (undated) RX ORDER — SIMETHICONE 40MG/0.6ML
SUSPENSION, DROPS(FINAL DOSAGE FORM)(ML) ORAL
Status: DISPENSED
Start: 2019-05-31

## (undated) RX ORDER — ACETAMINOPHEN 325 MG/1
TABLET ORAL
Status: DISPENSED
Start: 2019-05-31

## (undated) RX ORDER — FENTANYL CITRATE 50 UG/ML
INJECTION, SOLUTION INTRAMUSCULAR; INTRAVENOUS
Status: DISPENSED
Start: 2019-05-31

## (undated) RX ORDER — KETAMINE HCL IN 0.9 % NACL 50 MG/5 ML
SYRINGE (ML) INTRAVENOUS
Status: DISPENSED
Start: 2019-05-31

## (undated) RX ORDER — BUPIVACAINE HYDROCHLORIDE 2.5 MG/ML
INJECTION, SOLUTION EPIDURAL; INFILTRATION; INTRACAUDAL
Status: DISPENSED
Start: 2019-05-31